# Patient Record
Sex: MALE | Race: WHITE | NOT HISPANIC OR LATINO | Employment: FULL TIME | URBAN - METROPOLITAN AREA
[De-identification: names, ages, dates, MRNs, and addresses within clinical notes are randomized per-mention and may not be internally consistent; named-entity substitution may affect disease eponyms.]

---

## 2017-01-02 ENCOUNTER — OFFICE VISIT (OUTPATIENT)
Dept: WOUND CARE | Facility: MEDICAL CENTER | Age: 62
End: 2017-01-02
Attending: ORTHOPAEDIC SURGERY
Payer: MEDICARE

## 2017-01-02 PROCEDURE — A6253 ABSORPT DRG > 48 SQ IN W/O B: HCPCS

## 2017-01-02 PROCEDURE — A6407 PACKING STRIPS, NON-IMPREG: HCPCS

## 2017-01-02 PROCEDURE — 305523

## 2017-01-02 PROCEDURE — 304216

## 2017-01-02 PROCEDURE — A6402 STERILE GAUZE <= 16 SQ IN: HCPCS

## 2017-01-02 PROCEDURE — 97597 DBRDMT OPN WND 1ST 20 CM/<: CPT

## 2017-01-02 NOTE — WOUND TEAM
Advanced Wound Care  [facility]  [address]  [address]  [place] [address]  [phone] Fax: [phone]      Encounter note  For Certification Period: 12/26/2016 - 01/26/2017      Referring Physician: Abhishek Edwards MD  Primary Physician: Knog Jha MD  Consulting Physicians:         Wound(s): L foot medial and dorsal foot incisions  Start of Care: 12/26/16       Subjective:        HPI: 61 year old gentleman with a hx of HIV+ and IDDM , Charcot deformity and a navicular fx.  Pt underwent ORIF and gastrocnemius recession as well as an ankle fusion.  S/p I/D dehisced area of his medial foot incision with NPWT wound VAC placed post-operatively on 7/16/16.  Pt DC from [facility] 7/20/16.    Patient wound had matristem placed for 9 sessions and closure of wound on 11/16/2016.  Patient recently felt feverish and leg pain on 12/10/16, went to [facility] and discharged 12/13. Was seen at wound clinic following discharge. S/p Left foot removal of hardware, I & D, ankle arthrotomy by Dr. Edwards on 12/22/16. Discharged from [facility] on 12/24 referred to [facility] for treatment of the incisions.      Pain:  5/10 on 0-10 pain scale to left foot, pt took own oral pain meds prior to visit    Past Medical History:  Past Medical History   Diagnosis Date   • HIV (human immunodeficiency virus infection) ([facility])    • Diabetes ([facility])    • Neuropathy ([facility])    • Arthritis    • Renal disorder      5/4/2016,pt states not aware of any renal disorder   • Sleep apnea      diagnosed with sleep apnea,has cpap,lost 60 lbs,uses cpap sometimes   • Back pain      5/24/2016 states back pain not an issue at this time   • Bronchitis    • Depression    • Chickenpox    • Mumps    • Tonsillitis    • Whooping cough        Current Medications:   coumadin restarted, taking augmentin for 14 days            Allergies:   Allergies   Allergen Reactions   • Oxycodone      Hallucination  Rxn = May 2016           Objective:      Tests and  Measures:  Left Foot warm  +1 DP pulse, confirmed with doppler, audible DP and PT pulse strongly    Orthotic, protective, supportive devices: offloading boot, Walker, knee scooter    Fall Risk Assessment (edward all that apply with an X): Completed 12/26/2016   65 years or older     xFall within the last 2 years,    xAmbulatory devices, FWW  Loss of protective sensation in feet,   xUse of prostethic/orthotic, years    Presence of lower extremity/foot/toe amputation   xTaking medication that increases risk (per facility policy)    Interventions Recommended (if any of the above are selected):   xUse of Assistive Device:_________fww______________   Supervision with ambulation:  Caregiver   xAssistance with ambulation:  Caregiver   Bashir safety education:  Educational material provided         Wound Characteristics                                                    Location:  Left Foot dorsal incision  Initial Evaluation  Date: 12/26/2016 Encounter date   1/2/17   Tissue Type and %: Incision with sutures with pinpoint opening Incision dehiscing, dark red tissue to wound bed that is visible     Periwound: intact Intact with sutures, erythema   Drainage: heavy serosanguinous with tissue flecks with epression Heavy old clotted blood and pale white debris expressed   Exposed structures None none   Wound Edges:   Approximated except for opening Opening between sutures    Odor: None none   S&S of Infection:   None erythema   Edema: +3 pitting +3   Sensation: intact intact               Measurements:  L dorsal incision Initial Evaluation  Date: 12/26/2016 Encounter note  Date: 12/30/2016   Length (cm) 6.5 5.5 (entire incision measured)   Width (cm) 0.3 0.5   Depth (cm) 0.6 1.0   Area (cm2) 1.95 2.75 cm2   Tract/undermine 0.6 none       Location:  Left Foot Medial Midfoot incision Initial Evaluation  Date: 12/26/2016 Encounter date 1/2/17   Tissue Type and %: Incision approximated with sutures except for pinpoint opening   "Incision dehiscing, dark red tissue to wound bed that is visible     Periwound: Slight maceration Intact with sutures, erythema   Drainage: Heavy serosanguinous with tissue flecks with expression heavy old clotted blood and pale white debris expressed   Exposed structures None none   Wound Edges:   Approximated except for opening Open center of incision   Odor: None none   S&S of Infection:   None Faint erythema   Edema: +3 pitting 3+   Sensation: intact intact               Measurements:  L medial foot incision  Initial Evaluation  Date: 2016 Encounter note  Date: 2016  Only area of dehicense measured   Length (cm) 9.5 1.2   Width (cm) 0.2 0.4   Depth (cm) 0.9 1.9   Area (cm2) 1.9 0.48 cm2   Tract/undermine 0.9 none        Procedures:      Debridement :  selective debridement with surgilav and non selectively with NS moisten gauze and cotton tip applicator   Cleansed with:   Saline ~250ml                                                                    Periwound protected with: barrier paste   Primary dressin/4\" plain strip packing to all tracts to both incisions. depth of 1.5cm but extends 8cm horizontally in proximal direction on dorsal incision. medial incision tract extends 5cm    Secondary Dressing: abd pads, kerlix   Other: tubigrip F     Patient Education: 17: VAC held for this treatment d/t increase in thick clotted debris. pt to come in tomorrow for dressing change and surgilav. Pt verbalized understanding, updated on change in plan of care. Pt has continual ankle pain, has appt with Dr. Vergara to discuss quality of life as amputee/process of amputation tomorrow afternoon    Professional Collaboration: Maryc PT, Dr. Edwards-notified and updated in pt condition       Assessment:      Wound etiology: surgical     Wound Progress:  Increased depth to wounds    Rationale for Treatment: plain strip to assist with removal of debris, abd pads to manage exudate    Patient " tolerance/compliance: Patient tolerated treatment well.    Complicating factors:  HIV+,  DM2    Need for ongoing Advanced Wound Care services:Patient requires skilled therapeutic wound care services for product selection, application of product, debridement, close monitoring with clinical assessment for expedite of wound healing.       Plan:      Treatment Plan and Recommendations:  Selective, non selective debridement       Frequency: 3x/weekly      Treatment Goals: STG 2 Weeks  LTG 4 Weeks   Granulation Tissue: n/a n/a   Decrease Necrotic Tissue to: n/a n/a   Wound Phase:  n/a  n/a   Decrease Size by: Decrease drainage by 50% Decrease drainage by 100%   Periwound:  intact intact   Decrease tracts/undermining by: 10% 20%   Decrease Pain:  4/10 0/10       At the time of each visit a thorough assessment of the patient is completed to assure the  appropriateness of our plan of care.  The dressings or modalities may need to be adapted   from the original plan to address any significant changes in the wound environment.          Clinician Signature:_______________________________Date__________________      Physician Signature:______________________________Date:__________________

## 2017-01-03 ENCOUNTER — OFFICE VISIT (OUTPATIENT)
Dept: WOUND CARE | Facility: MEDICAL CENTER | Age: 62
End: 2017-01-03
Attending: INTERNAL MEDICINE
Payer: MEDICARE

## 2017-01-03 PROCEDURE — A6253 ABSORPT DRG > 48 SQ IN W/O B: HCPCS

## 2017-01-03 PROCEDURE — 97597 DBRDMT OPN WND 1ST 20 CM/<: CPT

## 2017-01-03 PROCEDURE — 302985 HCHG BULKY ROLL STERILE

## 2017-01-04 ENCOUNTER — NON-PROVIDER VISIT (OUTPATIENT)
Dept: WOUND CARE | Facility: MEDICAL CENTER | Age: 62
End: 2017-01-04
Attending: ORTHOPAEDIC SURGERY
Payer: MEDICARE

## 2017-01-04 PROCEDURE — 97602 WOUND(S) CARE NON-SELECTIVE: CPT

## 2017-01-04 PROCEDURE — 302985 HCHG BULKY ROLL STERILE

## 2017-01-04 PROCEDURE — 97597 DBRDMT OPN WND 1ST 20 CM/<: CPT

## 2017-01-04 PROCEDURE — A6253 ABSORPT DRG > 48 SQ IN W/O B: HCPCS

## 2017-01-04 PROCEDURE — A6457 TUBULAR DRESSING: HCPCS

## 2017-01-04 NOTE — WOUND TEAM
Advanced Wound Care  [facility]  [address]  [address]  [place] [address]  [phone] Fax: [phone]      Encounter note  For Certification Period: 12/26/2016 - 01/26/2017      Referring Physician: Abhishek Edwards MD  Primary Physician: Kong Jha MD  Consulting Physicians:         Wound(s): L foot medial and dorsal foot incisions  Start of Care: 12/26/16       Subjective:        HPI: 61 year old gentleman with a hx of HIV+ and IDDM , Charcot deformity and a navicular fx.  Pt underwent ORIF and gastrocnemius recession as well as an ankle fusion.  S/p I/D dehisced area of his medial foot incision with NPWT wound VAC placed post-operatively on 7/16/16.  Pt DC from [facility] 7/20/16.    Patient wound had matristem placed for 9 sessions and closure of wound on 11/16/2016.  Patient recently felt feverish and leg pain on 12/10/16, went to [facility] and discharged 12/13. Was seen at wound clinic following discharge. S/p Left foot removal of hardware, I & D, ankle arthrotomy by Dr. Edwards on 12/22/16. Discharged from [facility] on 12/24 referred to [facility] for treatment of the incisions.      Pain:  5/10 on 0-10 pain scale to left foot, pt took own oral pain meds prior to visit    Past Medical History:  Past Medical History   Diagnosis Date   • HIV (human immunodeficiency virus infection) ([facility]    • Diabetes ([facility])    • Neuropathy ([facility])    • Arthritis    • Renal disorder      5/4/2016,pt states not aware of any renal disorder   • Sleep apnea      diagnosed with sleep apnea,has cpap,lost 60 lbs,uses cpap sometimes   • Back pain      5/24/2016 states back pain not an issue at this time   • Bronchitis    • Depression    • Chickenpox    • Mumps    • Tonsillitis    • Whooping cough        Current Medications:   coumadin restarted, taking augmentin for 14 days            Allergies:   Allergies   Allergen Reactions   • Oxycodone      Hallucination  Rxn = May 2016           Objective:      Tests and  Measures:  Left Foot warm  +1 DP pulse, confirmed with doppler, audible DP and PT pulse strongly    Orthotic, protective, supportive devices: offloading boot, Walker, knee scooter    Fall Risk Assessment (edward all that apply with an X): Completed 12/26/2016   65 years or older     xFall within the last 2 years,    xAmbulatory devices, FWW  Loss of protective sensation in feet,   xUse of prostethic/orthotic, years    Presence of lower extremity/foot/toe amputation   xTaking medication that increases risk (per facility policy)    Interventions Recommended (if any of the above are selected):   xUse of Assistive Device:_________fww______________   Supervision with ambulation:  Caregiver   xAssistance with ambulation:  Caregiver   Bashir safety education:  Educational material provided         Wound Characteristics                                                    Location:  Left Foot dorsal incision  Initial Evaluation  Date: 12/26/2016 Encounter date   1/3/17   Tissue Type and %: Incision with sutures with pinpoint opening Incision dehiscing, dark red tissue to wound bed that is visible     Periwound: intact Intact with sutures, erythema   Drainage: heavy serosanguinous with tissue flecks with epression Heavy old clotted blood and pale white chunky debris expressed   Exposed structures None none   Wound Edges:   Approximated except for opening Opening between sutures    Odor: None none   S&S of Infection:   None erythema   Edema: +3 pitting +3   Sensation: intact intact               Measurements:  L dorsal incision Initial Evaluation  Date: 12/26/2016 Encounter note  Date: 12/30/2016   Length (cm) 6.5 5.5 (entire incision measured)   Width (cm) 0.3 0.5   Depth (cm) 0.6 1.0   Area (cm2) 1.95 2.75 cm2   Tract/undermine 0.6 1/3/17: 5 cm       Location:  Left Foot Medial Midfoot incision Initial Evaluation  Date: 12/26/2016 Encounter date 1/3/17   Tissue Type and %: Incision approximated with sutures except for pinpoint  "opening  Incision dehiscing, dark red tissue to wound bed that is visible     Periwound: Slight maceration Intact with sutures, erythema   Drainage: Heavy serosanguinous with tissue flecks with expression heavy old clotted blood and pale white debris expressed   Exposed structures None none   Wound Edges:   Approximated except for opening Open center of incision   Odor: None none   S&S of Infection:   None Faint erythema   Edema: +3 pitting 3+   Sensation: intact intact               Measurements:  L medial foot incision  Initial Evaluation  Date: 2016 Encounter note  Date: 2016  Only area of dehicense measured   Length (cm) 9.5 1.2   Width (cm) 0.2 0.4   Depth (cm) 0.9 1.9   Area (cm2) 1.9 0.48 cm2   Tract/undermine 0.9 1/3/17 8cm     1/3/17:  Measurements not done due to time constraints with scheduling    Procedures:      Debridement :  selective debridement with surgilav and non selectively with NS moisten gauze and cotton tip applicator- \" milking\" of tissue to express thick drainage   Cleansed with:   Saline ~250ml                                                                    Periwound protected with: barrier paste   Primary dressin/4\" plain strip packing to all tracts to both incisions.   Secondary Dressing: abd pads, kerlix   Other: tubigrip F     Patient Education: 1/3/17:  Today continue POC due to similar findings. Discussed amputation issues briefly. Pt seeing Dr. Vergara tomorrow.   17: VAC held for this treatment d/t increase in thick clotted debris. pt to come in tomorrow for dressing change and surgilav. Pt verbalized understanding, updated on change in plan of care. Pt has continual ankle pain, has appt with Dr. Vergara to discuss quality of life as amputee/process of amputation tomorrow afternoon    Professional Collaboration:     Assessment:      Wound etiology: surgical     Wound Progress:  Increased depth to wounds    Rationale for Treatment: plain strip to assist with " removal of debris, abd pads to manage exudate    Patient tolerance/compliance: Patient tolerated treatment well.    Complicating factors:  HIV+,  DM2    Need for ongoing Advanced Wound Care services:Patient requires skilled therapeutic wound care services for product selection, application of product, debridement, close monitoring with clinical assessment for expedite of wound healing.       Plan:      Treatment Plan and Recommendations:  Selective, non selective debridement       Frequency: 3x/weekly      Treatment Goals: STG 2 Weeks  LTG 4 Weeks   Granulation Tissue: n/a n/a   Decrease Necrotic Tissue to: n/a n/a   Wound Phase:  n/a  n/a   Decrease Size by: Decrease drainage by 50% Decrease drainage by 100%   Periwound:  intact intact   Decrease tracts/undermining by: 10% 20%   Decrease Pain:  4/10 0/10       At the time of each visit a thorough assessment of the patient is completed to assure the  appropriateness of our plan of care.  The dressings or modalities may need to be adapted   from the original plan to address any significant changes in the wound environment.          Clinician Signature:_______________________________Date__________________      Physician Signature:______________________________Date:__________________

## 2017-01-04 NOTE — WOUND TEAM
Advanced Wound Care  [facility]  [address]  [address]  [place] [address]  [phone] Fax: [phone]      Encounter note  For Certification Period: 12/26/2016 - 01/26/2017      Referring Physician: Abhishek Edwards MD  Primary Physician: Kong Jha MD  Consulting Physicians:         Wound(s): L foot medial and dorsal foot incisions  Start of Care: 12/26/16       Subjective:        HPI: 61 year old gentleman with a hx of HIV+ and IDDM , Charcot deformity and a navicular fx.  Pt underwent ORIF and gastrocnemius recession as well as an ankle fusion.  S/p I/D dehisced area of his medial foot incision with NPWT wound VAC placed post-operatively on 7/16/16.  Pt DC from [facility] 7/20/16.    Patient wound had matristem placed for 9 sessions and closure of wound on 11/16/2016.  Patient recently felt feverish and leg pain on 12/10/16, went to [facility] and discharged 12/13. Was seen at wound clinic following discharge. S/p Left foot removal of hardware, I & D, ankle arthrotomy by Dr. Edwards on 12/22/16. Discharged from [facility] on 12/24 referred to [facility] for treatment of the incisions.      Pain:  5/10 on 0-10 pain scale to left foot, pt took own oral pain meds prior to visit    Past Medical History:  Past Medical History   Diagnosis Date   • HIV (human immunodeficiency virus infection) ([facility])    • Diabetes ([facility])    • Neuropathy ([facility])    • Arthritis    • Renal disorder      5/4/2016,pt states not aware of any renal disorder   • Sleep apnea      diagnosed with sleep apnea,has cpap,lost 60 lbs,uses cpap sometimes   • Back pain      5/24/2016 states back pain not an issue at this time   • Bronchitis    • Depression    • Chickenpox    • Mumps    • Tonsillitis    • Whooping cough        Current Medications:   coumadin restarted, taking augmentin for 14 days            Allergies:   Allergies   Allergen Reactions   • Oxycodone      Hallucination  Rxn = May 2016           Objective:      Tests and  Measures:  Left Foot warm  +1 DP pulse, confirmed with doppler, audible DP and PT pulse strongly    Orthotic, protective, supportive devices: offloading boot, Walker, knee scooter    Fall Risk Assessment (edward all that apply with an X): Completed 12/26/2016   65 years or older     xFall within the last 2 years,    xAmbulatory devices, FWW  Loss of protective sensation in feet,   xUse of prostethic/orthotic, years    Presence of lower extremity/foot/toe amputation   xTaking medication that increases risk (per facility policy)    Interventions Recommended (if any of the above are selected):   xUse of Assistive Device:_________fww______________   Supervision with ambulation:  Caregiver   xAssistance with ambulation:  Caregiver   Bashir safety education:  Educational material provided         Wound Characteristics                                                    Location:  Left Foot dorsal incision  Initial Evaluation  Date: 12/26/2016 Encounter date   1/4/17   Tissue Type and %: Incision with sutures with pinpoint opening Incision dehiscing, dark red tissue to wound bed that is visible     Periwound: intact Intact with sutures, erythema   Drainage: heavy serosanguinous with tissue flecks with epression Heavy old clotted blood and pale white chunky debris expressed   Exposed structures None none   Wound Edges:   Approximated except for opening Opening between sutures    Odor: None none   S&S of Infection:   None erythema   Edema: +3 pitting +3   Sensation: intact intact               Measurements:  L dorsal incision Initial Evaluation  Date: 12/26/2016 Encounter note  Date: 1/4/17   Length (cm) 6.5 1.0   Width (cm) 0.3 0.5   Depth (cm) 0.6 1.0   Area (cm2) 1.95 2.75 cm2   Tract/undermine 0.6 4 cm from 11:00       Location:  Left Foot Medial Midfoot incision Initial Evaluation  Date: 12/26/2016 Encounter date 1/4/17   Tissue Type and %: Incision approximated with sutures except for pinpoint opening  Incision dehiscing,  "dark red tissue to wound bed that is visible     Periwound: Slight maceration Intact with sutures, erythema   Drainage: Heavy serosanguinous with tissue flecks with expression heavy old clotted blood and pale white debris expressed   Exposed structures None none   Wound Edges:   Approximated except for opening Open center of incision   Odor: None none   S&S of Infection:   None Faint erythema   Edema: +3 pitting 3+   Sensation: intact intact               Measurements:  L medial foot incision  Initial Evaluation  Date: 2016 Encounter note  Date: 17   Length (cm) 9.5 3 areas of dihisence along suture line   Width (cm) 0.2    Depth (cm) 0.9    Area (cm2) 1.9    Tract/undermine 0.9  4-6 cm from 6:00 to 3:00     1/3/17:  Measurements not done due to time constraints with scheduling    Procedures:      Debridement :  selective debridement with surgilav and non selectively with NS moisten gauze and cotton tip applicator- \" milking\" of tissue to express thick drainage   Cleansed with:   Saline ~250ml                                                                    Periwound protected with: barrier paste   Primary dressin/4\" plain strip packing to all tracts to both incisions. On long piece medially and 2 laterally   Secondary Dressing: abd pads, kerlix   Other: tubigrip F     Patient Education: 17:  Discussed S&S of infection.  Pt to watch area or erythema on ankle- if it rises pt needs to seek medical attn. ASAP for possible IV abx.  Pt to come to Dr. Tijerina's round 17 to have them assess foot.  Pt to have consult with Dr. Vergara on 17.  Dr. Edwards returns 17.    1/3/17:  Today continue POC due to similar findings. Discussed amputation issues briefly. Pt seeing Dr. Vergara tomorrow.   17: VAC held for this treatment d/t increase in thick clotted debris. pt to come in tomorrow for dressing change and surgilav. Pt verbalized understanding, updated on change in plan of care. Pt has " continual ankle pain, has appt with Dr. Vergara to discuss quality of life as amputee/process of amputation tomorrow afternoon    Professional Collaboration:   1/4/17:  Discussed situation with Mariam PRAKASH at length this am.  Pt on rounds for 1-6-17. Amputation has been and will be discussed.   Assessment:      Wound etiology: surgical     Wound Progress:  Increased depth to wounds- continued purulence and necrotic tissue copiously emerging from wound    Rationale for Treatment: plain strip to assist with removal of debris, abd pads to manage exudate    Patient tolerance/compliance: Patient tolerated treatment well.    Complicating factors:  HIV+,  DM2    Need for ongoing Advanced Wound Care services:Patient requires skilled therapeutic wound care services for product selection, application of product, debridement, close monitoring with clinical assessment for expedite of wound healing.       Plan:      Treatment Plan and Recommendations:  Selective, non selective debridement       Frequency: 3x/weekly      Treatment Goals: STG 2 Weeks  LTG 4 Weeks   Granulation Tissue: n/a n/a   Decrease Necrotic Tissue to: n/a n/a   Wound Phase:  n/a  n/a   Decrease Size by: Decrease drainage by 50% Decrease drainage by 100%   Periwound:  intact intact   Decrease tracts/undermining by: 10% 20%   Decrease Pain:  4/10 0/10       At the time of each visit a thorough assessment of the patient is completed to assure the  appropriateness of our plan of care.  The dressings or modalities may need to be adapted   from the original plan to address any significant changes in the wound environment.          Clinician Signature:_______________________________Date__________________      Physician Signature:______________________________Date:__________________

## 2017-01-05 ENCOUNTER — HOSPITAL ENCOUNTER (OUTPATIENT)
Facility: MEDICAL CENTER | Age: 62
End: 2017-01-06
Attending: EMERGENCY MEDICINE | Admitting: EMERGENCY MEDICINE
Payer: MEDICARE

## 2017-01-05 ENCOUNTER — APPOINTMENT (OUTPATIENT)
Dept: RADIOLOGY | Facility: MEDICAL CENTER | Age: 62
End: 2017-01-05
Attending: EMERGENCY MEDICINE
Payer: MEDICARE

## 2017-01-05 ENCOUNTER — OFFICE VISIT (OUTPATIENT)
Dept: URGENT CARE | Facility: PHYSICIAN GROUP | Age: 62
End: 2017-01-05
Payer: MEDICARE

## 2017-01-05 ENCOUNTER — OFFICE VISIT (OUTPATIENT)
Dept: WOUND CARE | Facility: MEDICAL CENTER | Age: 62
End: 2017-01-05
Attending: ORTHOPAEDIC SURGERY
Payer: MEDICARE

## 2017-01-05 ENCOUNTER — RESOLUTE PROFESSIONAL BILLING HOSPITAL PROF FEE (OUTPATIENT)
Dept: HOSPITALIST | Facility: MEDICAL CENTER | Age: 62
End: 2017-01-05
Payer: MEDICARE

## 2017-01-05 VITALS
SYSTOLIC BLOOD PRESSURE: 120 MMHG | DIASTOLIC BLOOD PRESSURE: 62 MMHG | HEART RATE: 94 BPM | RESPIRATION RATE: 16 BRPM | WEIGHT: 180 LBS | OXYGEN SATURATION: 95 % | TEMPERATURE: 98 F | BODY MASS INDEX: 25.83 KG/M2

## 2017-01-05 DIAGNOSIS — R53.1 WEAKNESS: ICD-10-CM

## 2017-01-05 DIAGNOSIS — B00.9 HERPES SIMPLEX: ICD-10-CM

## 2017-01-05 DIAGNOSIS — R21 RASH: ICD-10-CM

## 2017-01-05 DIAGNOSIS — R41.0 CONFUSION: ICD-10-CM

## 2017-01-05 PROBLEM — B20 AIDS (ACQUIRED IMMUNE DEFICIENCY SYNDROME) (HCC): Status: ACTIVE | Noted: 2017-01-05

## 2017-01-05 LAB
ALBUMIN SERPL BCP-MCNC: 2.8 G/DL (ref 3.2–4.9)
ALBUMIN/GLOB SERPL: 0.7 G/DL
ALP SERPL-CCNC: 178 U/L (ref 30–99)
ALT SERPL-CCNC: 7 U/L (ref 2–50)
ANION GAP SERPL CALC-SCNC: 8 MMOL/L (ref 0–11.9)
ANISOCYTOSIS BLD QL SMEAR: ABNORMAL
APPEARANCE UR: CLEAR
AST SERPL-CCNC: 16 U/L (ref 12–45)
BACTERIA #/AREA URNS HPF: ABNORMAL /HPF
BASOPHILS # BLD AUTO: 0 % (ref 0–1.8)
BASOPHILS # BLD: 0 K/UL (ref 0–0.12)
BILIRUB SERPL-MCNC: 0.3 MG/DL (ref 0.1–1.5)
BILIRUB UR QL STRIP.AUTO: NEGATIVE
BNP SERPL-MCNC: 65 PG/ML (ref 0–100)
BUN SERPL-MCNC: 7 MG/DL (ref 8–22)
CALCIUM SERPL-MCNC: 8.7 MG/DL (ref 8.5–10.5)
CHLORIDE SERPL-SCNC: 96 MMOL/L (ref 96–112)
CO2 SERPL-SCNC: 28 MMOL/L (ref 20–33)
COLOR UR: YELLOW
CREAT SERPL-MCNC: 0.41 MG/DL (ref 0.5–1.4)
CULTURE IF INDICATED INDCX: YES UA CULTURE
EOSINOPHIL # BLD AUTO: 0.28 K/UL (ref 0–0.51)
EOSINOPHIL NFR BLD: 3.5 % (ref 0–6.9)
ERYTHROCYTE [DISTWIDTH] IN BLOOD BY AUTOMATED COUNT: 50.7 FL (ref 35.9–50)
GFR SERPL CREATININE-BSD FRML MDRD: >60 ML/MIN/1.73 M 2
GLOBULIN SER CALC-MCNC: 3.8 G/DL (ref 1.9–3.5)
GLUCOSE SERPL-MCNC: 164 MG/DL (ref 65–99)
GLUCOSE UR STRIP.AUTO-MCNC: NEGATIVE MG/DL
HCT VFR BLD AUTO: 26.3 % (ref 42–52)
HGB BLD-MCNC: 8.1 G/DL (ref 14–18)
KETONES UR STRIP.AUTO-MCNC: NEGATIVE MG/DL
LACTATE BLD-SCNC: 1.1 MMOL/L (ref 0.5–2)
LEUKOCYTE ESTERASE UR QL STRIP.AUTO: NEGATIVE
LG PLATELETS BLD QL SMEAR: NORMAL
LYMPHOCYTES # BLD AUTO: 0.28 K/UL (ref 1–4.8)
LYMPHOCYTES NFR BLD: 3.5 % (ref 22–41)
MANUAL DIFF BLD: NORMAL
MCH RBC QN AUTO: 24.2 PG (ref 27–33)
MCHC RBC AUTO-ENTMCNC: 30.8 G/DL (ref 33.7–35.3)
MCV RBC AUTO: 78.5 FL (ref 81.4–97.8)
METAMYELOCYTES NFR BLD MANUAL: 1.7 %
MICRO URNS: ABNORMAL
MICROCYTES BLD QL SMEAR: ABNORMAL
MONOCYTES # BLD AUTO: 0.42 K/UL (ref 0–0.85)
MONOCYTES NFR BLD AUTO: 5.2 % (ref 0–13.4)
MORPHOLOGY BLD-IMP: NORMAL
NEUTROPHILS # BLD AUTO: 6.89 K/UL (ref 1.82–7.42)
NEUTROPHILS NFR BLD: 86.1 % (ref 44–72)
NITRITE UR QL STRIP.AUTO: NEGATIVE
NRBC # BLD AUTO: 0 K/UL
NRBC BLD AUTO-RTO: 0 /100 WBC
PH UR STRIP.AUTO: 7 [PH]
PLATELET # BLD AUTO: 584 K/UL (ref 164–446)
PLATELET BLD QL SMEAR: NORMAL
PMV BLD AUTO: 9.2 FL (ref 9–12.9)
POIKILOCYTOSIS BLD QL SMEAR: NORMAL
POTASSIUM SERPL-SCNC: 4.2 MMOL/L (ref 3.6–5.5)
PROT SERPL-MCNC: 6.6 G/DL (ref 6–8.2)
PROT UR QL STRIP: 50 MG/DL
RBC # BLD AUTO: 3.35 M/UL (ref 4.7–6.1)
RBC # URNS HPF: ABNORMAL /HPF
RBC BLD AUTO: PRESENT
RBC UR QL AUTO: NEGATIVE
SODIUM SERPL-SCNC: 132 MMOL/L (ref 135–145)
SP GR UR STRIP.AUTO: 1.01
STOMATOCYTES BLD QL SMEAR: NORMAL
TROPONIN I SERPL-MCNC: <0.01 NG/ML (ref 0–0.04)
WBC # BLD AUTO: 8 K/UL (ref 4.8–10.8)
WBC #/AREA URNS HPF: ABNORMAL /HPF

## 2017-01-05 PROCEDURE — 87086 URINE CULTURE/COLONY COUNT: CPT

## 2017-01-05 PROCEDURE — 99220 PR INITIAL OBSERVATION CARE,LEVL III: CPT | Performed by: HOSPITALIST

## 2017-01-05 PROCEDURE — 305382 HCHG ROLL-SOFT CONFORMING 4"

## 2017-01-05 PROCEDURE — 99213 OFFICE O/P EST LOW 20 MIN: CPT | Performed by: NURSE PRACTITIONER

## 2017-01-05 PROCEDURE — G0378 HOSPITAL OBSERVATION PER HR: HCPCS

## 2017-01-05 PROCEDURE — 85027 COMPLETE CBC AUTOMATED: CPT

## 2017-01-05 PROCEDURE — 83880 ASSAY OF NATRIURETIC PEPTIDE: CPT

## 2017-01-05 PROCEDURE — 71010 DX-CHEST-PORTABLE (1 VIEW): CPT

## 2017-01-05 PROCEDURE — 70450 CT HEAD/BRAIN W/O DYE: CPT

## 2017-01-05 PROCEDURE — 93005 ELECTROCARDIOGRAM TRACING: CPT | Performed by: EMERGENCY MEDICINE

## 2017-01-05 PROCEDURE — 85007 BL SMEAR W/DIFF WBC COUNT: CPT

## 2017-01-05 PROCEDURE — 80053 COMPREHEN METABOLIC PANEL: CPT

## 2017-01-05 PROCEDURE — 84484 ASSAY OF TROPONIN QUANT: CPT

## 2017-01-05 PROCEDURE — 36415 COLL VENOUS BLD VENIPUNCTURE: CPT

## 2017-01-05 PROCEDURE — A6253 ABSORPT DRG > 48 SQ IN W/O B: HCPCS

## 2017-01-05 PROCEDURE — 306250

## 2017-01-05 PROCEDURE — 81001 URINALYSIS AUTO W/SCOPE: CPT

## 2017-01-05 PROCEDURE — 99285 EMERGENCY DEPT VISIT HI MDM: CPT

## 2017-01-05 PROCEDURE — 83605 ASSAY OF LACTIC ACID: CPT

## 2017-01-05 PROCEDURE — 97597 DBRDMT OPN WND 1ST 20 CM/<: CPT

## 2017-01-05 PROCEDURE — A6402 STERILE GAUZE <= 16 SQ IN: HCPCS

## 2017-01-05 RX ORDER — AMOXICILLIN AND CLAVULANATE POTASSIUM 875; 125 MG/1; MG/1
1 TABLET, FILM COATED ORAL 2 TIMES DAILY
Status: DISCONTINUED | OUTPATIENT
Start: 2017-01-05 | End: 2017-01-05

## 2017-01-05 RX ORDER — PROMETHAZINE HYDROCHLORIDE 25 MG/1
12.5-25 SUPPOSITORY RECTAL EVERY 4 HOURS PRN
Status: DISCONTINUED | OUTPATIENT
Start: 2017-01-05 | End: 2017-01-06 | Stop reason: HOSPADM

## 2017-01-05 RX ORDER — EMTRICITABINE AND TENOFOVIR DISOPROXIL FUMARATE 200; 300 MG/1; MG/1
1 TABLET, FILM COATED ORAL DAILY
Status: DISCONTINUED | OUTPATIENT
Start: 2017-01-06 | End: 2017-01-06 | Stop reason: HOSPADM

## 2017-01-05 RX ORDER — DEXTROSE MONOHYDRATE 25 G/50ML
25 INJECTION, SOLUTION INTRAVENOUS
Status: DISCONTINUED | OUTPATIENT
Start: 2017-01-05 | End: 2017-01-06 | Stop reason: HOSPADM

## 2017-01-05 RX ORDER — ONDANSETRON 2 MG/ML
4 INJECTION INTRAMUSCULAR; INTRAVENOUS EVERY 4 HOURS PRN
Status: DISCONTINUED | OUTPATIENT
Start: 2017-01-05 | End: 2017-01-06 | Stop reason: HOSPADM

## 2017-01-05 RX ORDER — ACETAMINOPHEN 325 MG/1
650 TABLET ORAL EVERY 6 HOURS PRN
Status: DISCONTINUED | OUTPATIENT
Start: 2017-01-05 | End: 2017-01-06 | Stop reason: HOSPADM

## 2017-01-05 RX ORDER — ACYCLOVIR 800 MG/1
800 TABLET ORAL 2 TIMES DAILY
Status: DISCONTINUED | OUTPATIENT
Start: 2017-01-05 | End: 2017-01-06

## 2017-01-05 RX ORDER — ENALAPRILAT 1.25 MG/ML
1.25 INJECTION INTRAVENOUS EVERY 6 HOURS PRN
Status: DISCONTINUED | OUTPATIENT
Start: 2017-01-05 | End: 2017-01-06 | Stop reason: HOSPADM

## 2017-01-05 RX ORDER — ACYCLOVIR 800 MG/1
800 TABLET ORAL 2 TIMES DAILY
Qty: 10 TAB | Refills: 0 | Status: SHIPPED | OUTPATIENT
Start: 2017-01-05 | End: 2020-01-15

## 2017-01-05 RX ORDER — ONDANSETRON 4 MG/1
4 TABLET, ORALLY DISINTEGRATING ORAL EVERY 4 HOURS PRN
Status: DISCONTINUED | OUTPATIENT
Start: 2017-01-05 | End: 2017-01-06 | Stop reason: HOSPADM

## 2017-01-05 RX ORDER — HYDROMORPHONE HYDROCHLORIDE 2 MG/1
2 TABLET ORAL
Status: DISCONTINUED | OUTPATIENT
Start: 2017-01-05 | End: 2017-01-06 | Stop reason: HOSPADM

## 2017-01-05 RX ORDER — FERROUS SULFATE 325(65) MG
325 TABLET ORAL
Status: DISCONTINUED | OUTPATIENT
Start: 2017-01-06 | End: 2017-01-06 | Stop reason: HOSPADM

## 2017-01-05 RX ORDER — PROMETHAZINE HYDROCHLORIDE 25 MG/1
12.5-25 TABLET ORAL EVERY 4 HOURS PRN
Status: DISCONTINUED | OUTPATIENT
Start: 2017-01-05 | End: 2017-01-06 | Stop reason: HOSPADM

## 2017-01-05 RX ORDER — PAROXETINE HYDROCHLORIDE 20 MG/1
20 TABLET, FILM COATED ORAL DAILY
Status: DISCONTINUED | OUTPATIENT
Start: 2017-01-06 | End: 2017-01-06 | Stop reason: HOSPADM

## 2017-01-05 RX ORDER — SULFAMETHOXAZOLE AND TRIMETHOPRIM 800; 160 MG/1; MG/1
1 TABLET ORAL DAILY
Status: DISCONTINUED | OUTPATIENT
Start: 2017-01-06 | End: 2017-01-06 | Stop reason: HOSPADM

## 2017-01-05 RX ORDER — PNV NO.95/FERROUS FUM/FOLIC AC 28MG-0.8MG
1 TABLET ORAL
Status: DISCONTINUED | OUTPATIENT
Start: 2017-01-05 | End: 2017-01-05

## 2017-01-05 ASSESSMENT — ENCOUNTER SYMPTOMS
SHORTNESS OF BREATH: 0
LOSS OF CONSCIOUSNESS: 0
HEADACHES: 0
BLURRED VISION: 0
MYALGIAS: 0
COUGH: 0
HEADACHES: 0
VOMITING: 0
FEVER: 0
CHILLS: 0
ABDOMINAL PAIN: 0
NAUSEA: 0
WEAKNESS: 1

## 2017-01-05 ASSESSMENT — LIFESTYLE VARIABLES
EVER_SMOKED: NEVER
ALCOHOL_USE: NO

## 2017-01-05 ASSESSMENT — PAIN SCALES - GENERAL
PAINLEVEL_OUTOF10: 7
PAINLEVEL_OUTOF10: 3
PAINLEVEL_OUTOF10: 3

## 2017-01-05 NOTE — MR AVS SNAPSHOT
Edward Ohara   2017 3:00 PM   Office Visit   MRN: [id number]    Department:  [facility]   Dept Phone:  [phone]    Description:  Male : 1955   Provider:  JUAN ANTONIO Belle           Reason for Visit     Rash rash on bottom x 1 wk       Allergies as of 2017     Allergen Noted Reactions    Oxycodone 2016       Hallucination  Rxn = May 2016      You were diagnosed with     Herpes simplex   [[id number]]         Vital Signs     Blood Pressure Pulse Temperature Respirations Weight Oxygen Saturation    120/62 mmHg 94 36.7 °C (98 °F) 16 81.647 kg (180 lb) 95%    Smoking Status                   Never Smoker            Basic Information     Date Of Birth Sex Race Ethnicity Preferred Language    1955 Male White Non- English      Your appointments     2017  7:30 AM   Wound 30 Minute Procedure with Sonido Tijerina M.D., Briana Stallworth R.N.   [facility] ([address] [place] [address])    [address]  [place] [address]   [phone]            2017  9:00 AM   Wound 60 Minute Procedure with Nancy Mancilla R.N.   [facility] ([address] [place] [address])    [address]  [place] [address]   [phone]            2017  8:00 AM   Wound 60 Minute Procedure with Suki Hurt P.T.   [facility] ([address] [place] [address])    [address]  [place] [address]   [phone]            2017  1:00 PM   Follow UP with Abdullahi Xavier M.D.   [facility] (--)    [address]  [place] [address] [phone]            2017  8:15 AM   Wound 30 Minute Procedure with Sonido Tijerina M.D., Briana Stallworth R.N.   [facility] ([address] [place] [address])    [address]  [place] [address]   [phone]            2017  7:00 AM   Wound 30 Minute Procedure with Marianna Leyva R.N.   [facility] ([address] [place] [address])    [address]  [place] [address]   [phone]               Jan 16, 2017  7:30 AM   FOOT AND NAIL CARE INITIAL EVAL with Marianna Leyva R.N.   [facility] ([address] [place] [address])    [address]  [place] [address]   [phone]            Jan 18, 2017  8:00 AM   Wound 60 Minute Procedure with Nancy Mancilla R.N.   [facility] ([address] [place] [address])    [address]  [place] [address]   [phone]            Jan 20, 2017  9:00 AM   Wound 30 Minute Procedure with Suki Hurt P.T.   [facility] ([address] [place] [address])    [address]  [place] [address]   [phone]            Jan 23, 2017  8:00 AM   Wound 60 Minute Procedure with Petey Abel R.N.   [facility] ([address] [place] [address])    [address]  [place] [address]   [phone]            Jan 25, 2017  8:30 AM   Wound 60 Minute Procedure with Ebony Sanabria P.T.   [facility] ([address] [place] [address])    [address]  [place] [address]   [phone]            Jan 27, 2017 10:00 AM   Wound 60 Minute Procedure with Grace Watson R.N.   [facility] ([address] [place] [address])    [address]  [place] [address]   [phone]            Apr 26, 2017  1:00 PM   Diabetes Care Visit with Kacie Weston M.D., ENDOCRINOLOGY DIABETES RN   [facility])    [address]  [facility] [id number] [phone]           You will be receiving a confirmation call a few days before your appointment from our automated call confirmation system.              Problem List              ICD-10-CM Priority Class Noted - Resolved    Type 2 diabetes mellitus (HCC) E11.9 Medium  5/6/2015 - Present    Cellulitis of left foot L03.116   6/24/2015 - Present    HIV (human immunodeficiency virus infection) (HCC) Z21   8/27/2015 - Present    DKA (diabetic ketoacidoses) (HCC) E13.10   11/12/2015 - Present    Low back pain M54.5   11/13/2015 - Present    Rash R21   11/13/2015 - Present    Depression F32.9   11/13/2015 - Present    Closed  displaced fracture of navicular bone of left foot S92.252A   5/26/2016 - Present    Atrial fibrillation with rapid ventricular response (HCC) I48.91   6/4/2016 - Present    Peripheral neuropathy ([facility]) G62.9 Medium  6/5/2016 - Present    SARAY (obstructive sleep apnea) G47.33   6/15/2016 - Present    HIV infection ([facility]) Z21   6/15/2016 - Present    Type 2 diabetes mellitus without complication ([facility]) E11.9   6/15/2016 - Present    Atrial fibrillation status post cardioversion ([facility]) I48.91   6/15/2016 - Present    Excessive daytime sleepiness G47.19   6/15/2016 - Present    Charcot foot due to diabetes mellitus ([facility]) E11.610   7/12/2016 - Present    Infected wound T14.8, L08.9   7/12/2016 - Present    AIDS ([facility]) B20 Medium  7/13/2016 - Present    Anemia D64.9 Medium  7/13/2016 - Present    Hypokalemia E87.6 Medium  7/13/2016 - Present    Chronic osteomyelitis of left foot ([facility]) M86.672   10/17/2016 - Present    Microcytic anemia D50.9   12/11/2016 - Present    Osteomyelitis of foot, left, acute ([facility]) M86.172 High  12/11/2016 - Present    Closed fracture of navicular bone of left foot S92.252A   12/22/2016 - Present    Charcot's joint of ankle M14.679   12/22/2016 - Present      Health Maintenance        Date Due Completion Dates    IMM HEP B VACCINE (1 of 3 - Primary Series) 1955 ---    DIABETES MONOFILAMTENT / LE EXAM 4[birth year]15/1956 ---    IMM DTaP/Tdap/Td Vaccine (1 - Tdap) 10/15/1975 ---    COLONOSCOPY 10/15/2006 ---    RETINAL SCREENING 4/2/2016 4/2/2015 (Done)    Override on 4/2/2015: Done    IMM PNEUMOCOCCAL 19-64 (ADULT) HIGHEST RISK SERIES (2 of 3 - PCV13) 6/25/2016 6/25/2015    IMM INFLUENZA (1) 9/1/2016 11/5/2015, 9/9/2015, 9/9/2014    IMM ZOSTER VACCINE 10/15/2016 ---    A1C SCREENING 1/12/2017 7/12/2016, 6/5/2016, 2/16/2016, 12/3/2015, 11/13/2015, 9/15/2015, 6/25/2015, 5/6/2015, 2/27/2015    FASTING LIPID PROFILE 7/14/2017 7/14/2016, 6/5/2016, 2/8/2016, 12/3/2015, 2/27/2015    URINE ACR / MICROALBUMIN  10/25/2017 10/25/2016, 12/3/2015, 2/27/2015    SERUM CREATININE 12/23/2017 12/23/2016, 12/22/2016, 12/11/2016, 12/10/2016, 10/19/2016, 9/26/2016, 9/19/2016, 9/12/2016, 9/7/2016, 8/31/2016, 8/24/2016, 8/20/2016, 8/19/2016, 7/17/2016, 7/16/2016, 7/14/2016, 7/13/2016, 7/12/2016, 7/9/2016, 6/5/2016, 6/4/2016, 5/24/2016, 2/8/2016, 12/3/2015, 11/14/2015, 11/13/2015, 11/13/2015, 11/13/2015, 11/12/2015, 9/17/2015, 9/16/2015, 9/15/2015, 6/25/2015, 6/24/2015, 2/27/2015            Current Immunizations     Influenza TIV (IM) 9/9/2015, 9/9/2014    Influenza Vaccine Adult HD 11/5/2015    Pneumococcal polysaccharide vaccine (PPSV-23) 6/25/2015  5:12 PM      Below and/or attached are the medications your provider expects you to take. Review all of your home medications and newly ordered medications with your provider and/or pharmacist. Follow medication instructions as directed by your provider and/or pharmacist. Please keep your medication list with you and share with your provider. Update the information when medications are discontinued, doses are changed, or new medications (including over-the-counter products) are added; and carry medication information at all times in the event of emergency situations     Allergies:  OXYCODONE - (reactions not documented)               Medications  Valid as of: January 05, 2017 -  3:20 PM    Generic Name Brand Name Tablet Size Instructions for use    Acetaminophen (Tab) TYLENOL 500 MG Take 1,000 mg by mouth every 6 hours as needed.        Acyclovir (Tab) ZOVIRAX 800 MG Take 1 Tab by mouth 2 times a day.        Amoxicillin-Pot Clavulanate (Tab) AUGMENTIN 875-125 MG Take 1 Tab by mouth 2 times a day for 14 days.        Calcium Carbonate-Vitamin D (Tab) Calcium Carbonate-Vitamin D 600-400 MG-UNIT Take 2 Tabs by mouth every day.        Emtricitabine-Tenofovir DF (Tab) TRUVADA 200-300 MG Take 1 Tab by mouth every day.        Ferrous Sulfate (Tab) Iron 325 (65 FE) MG Take 1 Tab by mouth 3 times a  "day, with meals.        HYDROmorphone HCl (Tab) DILAUDID 2 MG Take 1 Tab by mouth every 3 hours as needed.        Insulin NPH Human (Isophane) (Suspension) HUMULIN,NOVOLIN 100 UNIT/ML Inject 40 Units as instructed 2 Times a Day. Pt takes:   30 units at 1200  30 units HS        Insulin Regular Human (Solution) HUMULIN R 100 Unit/mL Inject 5-30 Units as instructed 3 times a day before meals. Pt states \"I use a sliding scale\".        MetFORMIN HCl (Tab) GLUCOPHAGE 1000 MG Take 1,000 mg by mouth 2 times a day, with meals.        Morphine Sulfate (Tab) MS IR 15 MG Take 1 Tab by mouth every four hours as needed for Severe Pain.        Multiple Vitamin (Tab) THERAGRAN  Take 2 Tabs by mouth every day. Indications: pt has been taking \"for years\"        PARoxetine HCl (Tab) PAXIL 20 MG Take 1 Tab by mouth every day.        Raltegravir Potassium (Tab) ISENTRESS 400 MG Take 1 Tab by mouth 2 Times a Day.        Sulfamethoxazole-Trimethoprim (Tab) BACTRIM -160 MG Take 1 Tab by mouth every day.        Warfarin Sodium (Tab) COUMADIN 5 MG Take 5-7.5 mg by mouth every day. Pt takes:  5MG on Sun, Tue, Thur, and Sat  7.5MG on Mon, Wed, and Fri        .                 Medicines prescribed today were sent to:     [id number] - [place], [place] - [address] [address]    Phone: [phone] Fax: [phone]    Open 24 Hours?: No      Medication refill instructions:       If your prescription bottle indicates you have medication refills left, it is not necessary to call your provider’s office. Please contact your pharmacy and they will refill your medication.    If your prescription bottle indicates you do not have any refills left, you may request refills at any time through one of the following ways: The online [employer]) system (except Urgent Care), by calling your provider’s office, or by asking your pharmacy to contact your provider’s office with a refill request. Medication refills are processed " only during regular business hours and may not be available until the next business day. Your provider may request additional information or to have a follow-up visit with you prior to refilling your medication.   *Please Note: Medication refills are assigned a new Rx number when refilled electronically. Your pharmacy may indicate that no refills were authorized even though a new prescription for the same medication is available at the pharmacy. Please request the medicine by name with the pharmacy before contacting your provider for a refill.           Medical Referral Source Access Code: Activation code not generated  Current Medical Referral Source Status: Active

## 2017-01-05 NOTE — WOUND TEAM
Advanced Wound Care  [facility]  [address]  [address]  [place] [address]  [phone] Fax: [phone]      Encounter note  For Certification Period: 12/26/2016 - 01/26/2017      Referring Physician: Abhishek Edwards MD  Primary Physician: Kong Jha MD  Consulting Physicians:         Wound(s): L foot medial and dorsal foot incisions  Start of Care: 12/26/16       Subjective:        HPI: 61 year old gentleman with a hx of HIV+ and IDDM , Charcot deformity and a navicular fx.  Pt underwent ORIF and gastrocnemius recession as well as an ankle fusion.  S/p I/D dehisced area of his medial foot incision with NPWT wound VAC placed post-operatively on 7/16/16.  Pt DC from [facility] 7/20/16.    Patient wound had matristem placed for 9 sessions and closure of wound on 11/16/2016.  Patient recently felt feverish and leg pain on 12/10/16, went to [facility] and discharged 12/13. Was seen at wound clinic following discharge. S/p Left foot removal of hardware, I & D, ankle arthrotomy by Dr. Edwards on 12/22/16. Discharged from [facility] on 12/24 referred to [facility] for treatment of the incisions.      Pain:  5/10 on 0-10 pain scale to left foot, pt took own oral pain meds prior to visit    Past Medical History:  Past Medical History   Diagnosis Date   • HIV (human immunodeficiency virus infection) ([facility])    • Diabetes ([facility])    • Neuropathy ([facility])    • Arthritis    • Renal disorder      5/4/2016,pt states not aware of any renal disorder   • Sleep apnea      diagnosed with sleep apnea,has cpap,lost 60 lbs,uses cpap sometimes   • Back pain      5/24/2016 states back pain not an issue at this time   • Bronchitis    • Depression    • Chickenpox    • Mumps    • Tonsillitis    • Whooping cough        Current Medications:   coumadin restarted, taking augmentin for 14 days            Allergies:   Allergies   Allergen Reactions   • Oxycodone      Hallucination  Rxn = May 2016           Objective:      Tests and  Measures:  Left Foot warm  +1 DP pulse, confirmed with doppler, audible DP and PT pulse strongly    Orthotic, protective, supportive devices: offloading boot, Walker, knee scooter    Fall Risk Assessment (edward all that apply with an X): Completed 12/26/2016   65 years or older     xFall within the last 2 years,    xAmbulatory devices, FWW  Loss of protective sensation in feet,   xUse of prostethic/orthotic, years    Presence of lower extremity/foot/toe amputation   xTaking medication that increases risk (per facility policy)    Interventions Recommended (if any of the above are selected):   xUse of Assistive Device:_________fww______________   Supervision with ambulation:  Caregiver   xAssistance with ambulation:  Caregiver   Bashir safety education:  Educational material provided         Wound Characteristics                                                    Location:  Left Foot dorsal incision  Initial Evaluation  Date: 12/26/2016 Encounter date   1/4/17   Tissue Type and %: Incision with sutures with pinpoint opening Incision dehiscing, dark red tissue to wound bed that is visible     Periwound: intact Intact with sutures, erythema   Drainage: heavy serosanguinous with tissue flecks with epression Heavy old clotted blood and pale white chunky debris expressed   Exposed structures None none   Wound Edges:   Approximated except for opening Opening between sutures    Odor: None none   S&S of Infection:   None erythema   Edema: +3 pitting +3   Sensation: intact intact               Measurements:  L dorsal incision Initial Evaluation  Date: 12/26/2016 Encounter note  Date: 1/4/17   Length (cm) 6.5 1.0   Width (cm) 0.3 0.5   Depth (cm) 0.6 1.0   Area (cm2) 1.95 2.75 cm2   Tract/undermine 0.6 4 cm from 11:00       Location:  Left Foot Medial Midfoot incision Initial Evaluation  Date: 12/26/2016 Encounter date 1/4/17   Tissue Type and %: Incision approximated with sutures except for pinpoint opening  Incision dehiscing,  "dark red tissue to wound bed that is visible     Periwound: Slight maceration Intact with sutures, erythema, edema   Drainage: Heavy serosanguinous with tissue flecks with expression Mod/heavy red/tan and purulent exudate   Exposed structures None none   Wound Edges:   Approximated except for opening Open   Odor: None none   S&S of Infection:   None Edema,erythema, exudate, dehiscence   Edema: +3 pitting 4+   Sensation: intact intact               Measurements:  L medial foot incision  Initial Evaluation  Date: 2016 Encounter note  Date: 17   Length (cm) 9.5 3 areas of dihisence along suture line   Width (cm) 0.2    Depth (cm) 0.9    Area (cm2) 1.9    Tract/undermine 0.9  4-6 cm from 6:00 to 3:00     1/3/17:  Measurements not done due to time constraints with scheduling    Procedures:      Debridement :  selective debridement with surgilav and non selectively with NS moisten gauze and cotton tip applicator- \" milking\" of tissue to express thick red/tan and purulent exudate. Forceps to remove non-viable tissue, slough. Area debrided approx 2cm2   Cleansed with:   NSaline 500mll                                                                    Periwound protected with: barrier paste   Primary dressin/4\" plain strip packing to all tracts to both incisions. On long piece medially and 2 laterally   Secondary Dressing: abd pads, kerlix   Other: tubigrip G-holding boot as edges place direct pressure on med/lateral foot     Patient Education: 17:  Discussed S&S of infection.  Pt to watch area or erythema on ankle- if it rises pt needs to seek medical attn. ASAP for possible IV abx.  Pt to come to Dr. Tijerina's round 17 to have them assess foot.  Pt to have consult with Dr. Vergara on 17.  Dr. Edwards returns 17.    1/3/17:  Today continue POC due to similar findings. Discussed amputation issues briefly. Pt seeing Dr. Vergara tomorrow.   17: VAC held for this treatment d/t increase in " thick clotted debris. pt to come in tomorrow for dressing change and surgilav. Pt verbalized understanding, updated on change in plan of care. Pt has continual ankle pain, has appt with Dr. Vergara to discuss quality of life as amputee/process of amputation tomorrow afternoon    Professional Collaboration:   1/4/17:  Discussed situation with Mariam PRAKASH at length this am.  Pt on rounds for 1-6-17. Amputation has been and will be discussed.   Assessment:      Wound etiology: surgical     Wound Progress:  Increased depth to wounds- continued purulence and necrotic tissue copiously emerging from wound    Rationale for Treatment: plain strip to assist with removal of debris, abd pads to manage exudate    Patient tolerance/compliance: Patient tolerated treatment well.    Complicating factors:  HIV+,  DM2    Need for ongoing Advanced Wound Care services:Patient requires skilled therapeutic wound care services for product selection, application of product, debridement, close monitoring with clinical assessment for expedite of wound healing.       Plan:      Treatment Plan and Recommendations:  Selective, non selective debridement       Frequency: 3x/weekly      Treatment Goals: STG 2 Weeks  LTG 4 Weeks   Granulation Tissue: n/a n/a   Decrease Necrotic Tissue to: n/a n/a   Wound Phase:  n/a  n/a   Decrease Size by: Decrease drainage by 50% Decrease drainage by 100%   Periwound:  intact intact   Decrease tracts/undermining by: 10% 20%   Decrease Pain:  4/10 0/10       At the time of each visit a thorough assessment of the patient is completed to assure the  appropriateness of our plan of care.  The dressings or modalities may need to be adapted   from the original plan to address any significant changes in the wound environment.          Clinician Signature:_______________________________Date__________________      Physician Signature:______________________________Date:__________________

## 2017-01-05 NOTE — PROGRESS NOTES
Subjective:      Edward Ohara is a 61 y.o. male who presents with Rash            HPI Comments: Medications, Allergies and Prior Medical Hx reviewed and updated in [facility].with patient/family today     Patient states he was in the hospital recently. During that time he developed a rash around his rectal area. Patient states she has a history of herpes infection. This feels like his previous herpes infections in the past he has used acyclovir.    Rash  This is a new problem. The current episode started in the past 7 days. The affected locations include the left buttock and right buttock. The rash is characterized by blistering. He was exposed to nothing. Pertinent negatives include no fever or vomiting. Past treatments include nothing. The treatment provided no relief.       Review of Systems   Constitutional: Negative for fever and chills.   Gastrointestinal: Negative for nausea, vomiting and abdominal pain.   Musculoskeletal: Negative for myalgias.   Skin: Positive for rash. Negative for itching.   Neurological: Negative for headaches.          Objective:     /62 mmHg  Pulse 94  Temp(Src) 36.7 °C (98 °F)  Resp 16  Wt 81.647 kg (180 lb)  SpO2 95%     Physical Exam   Constitutional: He appears well-developed and well-nourished. No distress.   HENT:   Head: Normocephalic and atraumatic.   Eyes: Conjunctivae are normal. Pupils are equal, round, and reactive to light.   Neck: Neck supple.   Cardiovascular: Normal rate.    Pulmonary/Chest: Effort normal. No respiratory distress.   Genitourinary:         Vesicular rash with erythema   Neurological: He is alert.   Awake, alert, answering questions appropriately, moving all extremeties   Skin: Skin is warm and dry. No rash noted.   Psychiatric: He has a normal mood and affect. His behavior is normal.   Vitals reviewed.              Assessment/Plan:       1. Herpes simplex  acyclovir (ZOVIRAX) 800 MG Tab   2. Rash  hydrocortisone acetate 0.5 % cream        Pt also requested a refill on hydrocortisone for a recurrent rash on his foot and back    Pt will go to the ER for worsening or changing symptoms as discussed,   Follow-up with your primary care provider or return here if not improving   Discharge instructions discussed with pt/family who verbalize understanding and agreement with poc    .[email or web address]

## 2017-01-05 NOTE — IP AVS SNAPSHOT
" After Visit Summary                                                                                                                  Name:Edward Vergarafield  Medical Record Number:[id number]  CSN: [id number]    YOB: 1955   Age: 61 y.o.  Sex: male  HT:1.778 m (5' 10\") WT: 83.8 kg (184 lb 11.9 oz)          Admit Date: 1/5/2017     Discharge Date:   Today's Date: 1/6/2017  Attending Doctor:  Pedro Luis Painting M.D.                  Allergies:  Oxycodone            Discharge Instructions       Discharge Instructions    Discharged to home by car with relative. Discharged via wheelchair, hospital escort: Yes.  Special equipment needed: Not Applicable    Be sure to schedule a follow-up appointment with your primary care doctor or any specialists as instructed.     Discharge Plan:   Diet Plan: Discussed (Diabetic)  Activity Level: Discussed (As tolerated)  Confirmed Follow up Appointment: Patient to Call and Schedule Appointment  Confirmed Symptoms Management: Discussed  Medication Reconciliation Updated: Yes  Influenza Vaccine Indication: Not indicated: Previously immunized this influenza season and > 8 years of age    I understand that a diet low in cholesterol, fat, and sodium is recommended for good health. Unless I have been given specific instructions below for another diet, I accept this instruction as my diet prescription.   Other diet: Diabetic    Special Instructions: None    · Is patient discharged on Warfarin / Coumadin?   No     · Is patient Post Blood Transfusion?  No    Depression / Suicide Risk    As you are discharged from this [facility] facility, it is important to learn how to keep safe from harming yourself.    Recognize the warning signs:  · Abrupt changes in personality, positive or negative- including increase in energy   · Giving away possessions  · Change in eating patterns- significant weight changes-  positive or negative  · Change in sleeping patterns- unable to sleep or " sleeping all the time   · Unwillingness or inability to communicate  · Depression  · Unusual sadness, discouragement and loneliness  · Talk of wanting to die  · Neglect of personal appearance   · Rebelliousness- reckless behavior  · Withdrawal from people/activities they love  · Confusion- inability to concentrate     If you or a loved one observes any of these behaviors or has concerns about self-harm, here's what you can do:  · Talk about it- your feelings and reasons for harming yourself  · Remove any means that you might use to hurt yourself (examples: pills, rope, extension cords, firearm)  · Get professional help from the community (Mental Health, Substance Abuse, psychological counseling)  · Do not be alone:Call your Safe Contact- someone whom you trust who will be there for you.  · Call your local CRISIS HOTLINE 938-8971 or 825-009-2259  · Call your local Children's Mobile Crisis Response Team [place] (533) 396-1398 or www.[employer]  · Call the toll free National Suicide Prevention Hotlines   · National Suicide Prevention Lifeline 564-979-WMEC (0441)  · National Hope Line Network 800-SUICIDE (761-5643)        Your appointments     Jan 09, 2017  9:00 AM   Wound 60 Minute Procedure with Nancy Mancilla R.N.   [facility] ([address] [place] [address])    [address]  [place] [id number] [phone]            Jan 11, 2017  8:00 AM   Wound 60 Minute Procedure with Briana Stallworth R.N.   [facility] ([address] [place] [address])    [address]  [place] [address]   [phone]            Jan 12, 2017  1:00 PM   Follow UP with Abdullahi Xavier M.D.   [facility] [place] [facility] (--)    [address]  [place] [id number] [phone]            Jan 13, 2017  8:15 AM   Wound 30 Minute Procedure with Sonido Tijerina M.D., Briana Stallworth R.N.   [facility] ([address] [place] [address])    [address]  [place] [address]   [phone]            Jan 16, 2017  7:00 AM   Wound  30 Minute Procedure with Marianna Leyva R.N.   [facility] ([address] [place] [address])    [address]  [place] [address]   [phone]            Jan 16, 2017  7:30 AM   FOOT AND NAIL CARE INITIAL EVAL with Marianna Leyva R.N.   [facility] ([address] [place] [address])    [address]  [place] [address]   [phone]            Jan 18, 2017  8:00 AM   Wound 60 Minute Procedure with Nancy Mancilla R.N.   [facility] ([address] [place] [address])    [address]  [place] [address]   [phone]            Jan 20, 2017  9:00 AM   Wound 30 Minute Procedure with Suki Hurt P.T.   [facility] ([address] [place] [address])    [address]  [place] [address]   [phone]            Jan 23, 2017  8:00 AM   Wound 60 Minute Procedure with Petey Abel R.N.   [facility] ([address] [place] [address])    [address]  [place] [address]   [phone]            Jan 25, 2017  8:30 AM   Wound 60 Minute Procedure with Ebony Sanabria P.T.   [facility] ([address] [place] [address])    [address]  [place] [address]   [phone]            Jan 27, 2017 10:00 AM   Wound 60 Minute Procedure with Grace Watson R.N.   [facility] ([address] [place] [address])    [address]  [place] [address]   [phone]            Apr 26, 2017  1:00 PM   Diabetes Care Visit with Kacie Weston M.D., ENDOCRINOLOGY DIABETES RN   [facility])    [address]  [place] [address] [phone]           You will be receiving a confirmation call a few days before your appointment from our automated call confirmation system.              Follow-up Information     1. Follow up with Sonido Tijerina M.D..    Specialty:  Orthopaedics    Contact information    [address]  [address]  [place] [address]  [phone]           Discharge Medication Instructions:    Below are the medications your physician expects you to take upon discharge:    Review all your home  "medications and newly ordered medications with your doctor and/or pharmacist. Follow medication instructions as directed by your doctor and/or pharmacist.    Please keep your medication list with you and share with your physician.               Medication List      CHANGE how you take these medications        Instructions    acyclovir 800 MG Tabs   What changed:    - how much to take  - when to take this   Commonly known as:  ZOVIRAX    Take 1 Tab by mouth 2 times a day.   Dose:  800 mg         CONTINUE taking these medications        Instructions    acetaminophen 500 MG Tabs   Commonly known as:  TYLENOL    Take 1,000 mg by mouth every 6 hours as needed.   Dose:  1000 mg       amoxicillin-clavulanate 875-125 MG Tabs   Commonly known as:  AUGMENTIN    Take 1 Tab by mouth 2 times a day for 14 days.   Dose:  1 Tab       emtricitabine-tenofovir 200-300 MG per tablet   Last time this was given:  1 Tab on 1/6/2017  8:48 AM   Commonly known as:  TRUVADA    Take 1 Tab by mouth every day.   Dose:  1 Tab       HUMULIN R 100 Unit/mL Soln   Generic drug:  insulin regular    Inject 5-30 Units as instructed 3 times a day before meals. Pt states \"I use a sliding scale\".   Dose:  5-30 Units       HYDROmorphone 2 MG Tabs   Last time this was given:  2 mg on 1/6/2017 12:09 PM   Commonly known as:  DILAUDID    Take 1 Tab by mouth every 3 hours as needed.   Dose:  2 mg       Iron 325 (65 FE) MG Tabs   Last time this was given:  325 mg on 1/6/2017  1:37 PM    Take 1 Tab by mouth 3 times a day, with meals.   Dose:  1 Tab       metformin 1000 MG tablet   Last time this was given:  1,000 mg on 1/6/2017  8:48 AM   Commonly known as:  GLUCOPHAGE    Take 1,000 mg by mouth 2 times a day, with meals.   Dose:  1000 mg       morphine 15 MG tablet   Commonly known as:  MS IR    Take 1 Tab by mouth every four hours as needed for Severe Pain.   Dose:  15 mg       multivitamin Tabs   Last time this was given:  2 Tabs on 1/6/2017  8:48 AM    Take 2 " "Tabs by mouth every day. Indications: pt has been taking \"for years\"   Dose:  2 Tab       NOVOLIN N RELION 100 UNIT/ML Susp   Last time this was given:  30 Units on 1/6/2017  6:13 AM   Generic drug:  insulin NPH    Inject 40 Units as instructed 2 Times a Day. Pt takes:  30 units at 1200 30 units HS   Dose:  40 Units       paroxetine 20 MG Tabs   Last time this was given:  20 mg on 1/6/2017  8:48 AM   Commonly known as:  PAXIL    Take 1 Tab by mouth every day.   Dose:  20 mg       raltegravir 400 MG Tabs   Last time this was given:  400 mg on 1/6/2017  8:48 AM   Commonly known as:  ISENTRESS    Take 1 Tab by mouth 2 Times a Day.   Dose:  400 mg       sulfamethoxazole-trimethoprim 800-160 MG tablet   Last time this was given:  1 Tab on 1/6/2017  8:48 AM   Commonly known as:  BACTRIM DS    Take 1 Tab by mouth every day.   Dose:  1 Tab       warfarin 5 MG Tabs   Commonly known as:  COUMADIN    Take 5-7.5 mg by mouth every day. Pt takes: 5MG on Sun, Tue, Thur, and Sat 7.5MG on Mon, Wed, and Fri   Dose:  5-7.5 mg               Instructions           Diet / Nutrition:    Follow any diet instructions given to you by your doctor or the dietician, including how much salt (sodium) you are allowed each day.    If you are overweight, talk to your doctor about a weight reduction plan.    Activity:    Remain physically active following your doctor's instructions about exercise and activity.    Rest often.     Any time you become even a little tired or short of breath, SIT DOWN and rest.    Worsening Symptoms:    Report any of the following signs and symptoms to the doctor's office immediately:    *Pain of jaw, arm, or neck  *Chest pain not relieved by medication                               *Dizziness or loss of consciousness  *Difficulty breathing even when at rest   *More tired than usual                                       *Bleeding drainage or swelling of surgical site  *Swelling of feet, ankles, legs or stomach             "     *Fever (>100ºF)  *Pink or blood tinged sputum  *Weight gain (3lbs/day or 5lbs /week)           *Shock from internal defibrillator (if applicable)  *Palpitations or irregular heartbeats                *Cool and/or numb extremities    Stroke Awareness    Common Risk Factors for Stroke include:    Age  Atrial Fibrillation  Carotid Artery Stenosis  Diabetes Mellitus  Excessive alcohol consumption  High blood pressure  Overweight   Physical inactivity  Smoking    Warning signs and symptoms of a stroke include:    *Sudden numbness or weakness of the face, arm or leg (especially on one side of the body).  *Sudden confusion, trouble speaking or understanding.  *Sudden trouble seeing in one or both eyes.  *Sudden trouble walking, dizziness, loss of balance or coordination.Sudden severe headache with no known cause.    It is very important to get treatment quickly when a stroke occurs. If you experience any of the above warning signs, call 911 immediately.                   Disclaimer         Quit Smoking / Tobacco Use:    I understand the use of any tobacco products increases my chance of suffering from future heart disease or stroke and could cause other illnesses which may shorten my life. Quitting the use of tobacco products is the single most important thing I can do to improve my health. For further information on smoking / tobacco cessation call a Toll Free Quit Line at 1-621.528.6116 (*National Cancer [place]) or 1-584.702.9998 (American Lung Association) or you can access the web based program at www.lungusa.org.    [place] Tobacco Users Help Line:  (719) 627-8458       Toll Free: 1-159.423.2948  Quit Tobacco Program [facility] (792)272-5389    Crisis Hotline:    [place] Crisis Hotline:  1-922-YTXWLJQ or 1-210.733.1499    [place] Crisis Hotline:    1-674.510.8312 or 271-934-7976    Discharge Survey:   Thank you for choosing [facility]. We hope we did everything we could to make your  hospital stay a pleasant one. You may be receiving a phone survey and we would appreciate your time and participation in answering the questions. Your input is very valuable to us in our efforts to improve our service to our patients and their families.        My signature on this form indicates that:    1. I have reviewed and understand the above information.  2. My questions regarding this information have been answered to my satisfaction.  3. I have formulated a plan with my discharge nurse to obtain my prescribed medications for home.                  Disclaimer         __________________________________                     __________       ________                       Patient Signature                                                 Date                    Time

## 2017-01-06 ENCOUNTER — OFFICE VISIT (OUTPATIENT)
Dept: WOUND CARE | Facility: MEDICAL CENTER | Age: 62
End: 2017-01-06
Attending: ORTHOPAEDIC SURGERY
Payer: MEDICARE

## 2017-01-06 VITALS
HEART RATE: 77 BPM | OXYGEN SATURATION: 96 % | HEIGHT: 70 IN | DIASTOLIC BLOOD PRESSURE: 60 MMHG | WEIGHT: 184.75 LBS | BODY MASS INDEX: 26.45 KG/M2 | TEMPERATURE: 98.1 F | SYSTOLIC BLOOD PRESSURE: 120 MMHG | RESPIRATION RATE: 18 BRPM

## 2017-01-06 PROBLEM — R41.0 CONFUSION: Status: RESOLVED | Noted: 2017-01-05 | Resolved: 2017-01-06

## 2017-01-06 LAB
AMMONIA PLAS-SCNC: 24 UMOL/L (ref 11–45)
GLUCOSE BLD-MCNC: 121 MG/DL (ref 65–99)
GLUCOSE BLD-MCNC: 256 MG/DL (ref 65–99)
MAGNESIUM SERPL-MCNC: 1.6 MG/DL (ref 1.5–2.5)
TSH SERPL DL<=0.005 MIU/L-ACNC: 3.8 UIU/ML (ref 0.3–3.7)

## 2017-01-06 PROCEDURE — 86360 T CELL ABSOLUTE COUNT/RATIO: CPT

## 2017-01-06 PROCEDURE — 83735 ASSAY OF MAGNESIUM: CPT

## 2017-01-06 PROCEDURE — 36415 COLL VENOUS BLD VENIPUNCTURE: CPT

## 2017-01-06 PROCEDURE — 99217 PR OBSERVATION CARE DISCHARGE: CPT | Performed by: HOSPITALIST

## 2017-01-06 PROCEDURE — 84443 ASSAY THYROID STIM HORMONE: CPT

## 2017-01-06 PROCEDURE — A9270 NON-COVERED ITEM OR SERVICE: HCPCS | Performed by: HOSPITALIST

## 2017-01-06 PROCEDURE — 96372 THER/PROPH/DIAG INJ SC/IM: CPT

## 2017-01-06 PROCEDURE — 86359 T CELLS TOTAL COUNT: CPT

## 2017-01-06 PROCEDURE — 700102 HCHG RX REV CODE 250 W/ 637 OVERRIDE(OP): Performed by: HOSPITALIST

## 2017-01-06 PROCEDURE — 82140 ASSAY OF AMMONIA: CPT

## 2017-01-06 PROCEDURE — G0378 HOSPITAL OBSERVATION PER HR: HCPCS

## 2017-01-06 PROCEDURE — 82962 GLUCOSE BLOOD TEST: CPT

## 2017-01-06 RX ORDER — ACYCLOVIR 200 MG/1
400 CAPSULE ORAL 3 TIMES DAILY
Status: DISCONTINUED | OUTPATIENT
Start: 2017-01-06 | End: 2017-01-06 | Stop reason: HOSPADM

## 2017-01-06 RX ORDER — ACYCLOVIR 200 MG/1
400 CAPSULE ORAL ONCE
Status: COMPLETED | OUTPATIENT
Start: 2017-01-06 | End: 2017-01-06

## 2017-01-06 RX ADMIN — ACYCLOVIR 400 MG: 200 CAPSULE ORAL at 08:48

## 2017-01-06 RX ADMIN — EMTRICITABINE AND TENOFOVIR DISOPROXIL FUMARATE 1 TABLET: 200; 300 TABLET, FILM COATED ORAL at 08:48

## 2017-01-06 RX ADMIN — FERROUS SULFATE TAB 325 MG (65 MG ELEMENTAL FE) 325 MG: 325 (65 FE) TAB at 13:37

## 2017-01-06 RX ADMIN — THERA TABS 2 TABLET: TAB at 08:48

## 2017-01-06 RX ADMIN — INSULIN LISPRO 3 UNITS: 100 INJECTION, SOLUTION INTRAVENOUS; SUBCUTANEOUS at 06:13

## 2017-01-06 RX ADMIN — HYDROMORPHONE HYDROCHLORIDE 2 MG: 2 TABLET ORAL at 12:09

## 2017-01-06 RX ADMIN — FERROUS SULFATE TAB 325 MG (65 MG ELEMENTAL FE) 325 MG: 325 (65 FE) TAB at 08:48

## 2017-01-06 RX ADMIN — PAROXETINE HYDROCHLORIDE 20 MG: 20 TABLET, FILM COATED ORAL at 08:48

## 2017-01-06 RX ADMIN — METFORMIN HYDROCHLORIDE 1000 MG: 500 TABLET, FILM COATED ORAL at 08:48

## 2017-01-06 RX ADMIN — SULFAMETHOXAZOLE AND TRIMETHOPRIM 1 TABLET: 800; 160 TABLET ORAL at 08:48

## 2017-01-06 RX ADMIN — ACYCLOVIR 400 MG: 200 CAPSULE ORAL at 01:19

## 2017-01-06 RX ADMIN — RALTEGRAVIR 400 MG: 400 TABLET, FILM COATED ORAL at 08:48

## 2017-01-06 RX ADMIN — INSULIN HUMAN 30 UNITS: 100 INJECTION, SUSPENSION SUBCUTANEOUS at 06:13

## 2017-01-06 RX ADMIN — HYDROMORPHONE HYDROCHLORIDE 2 MG: 2 TABLET ORAL at 09:08

## 2017-01-06 NOTE — H&P
PRIMARY CARE:  Brennan Boyce M.D.    PRIOR ORTHOPEDIST:  Abhishek Edwards M.D.    CURRENT PRIMARY CARE PROVIDER:  Kong Jha M.D.    CHIEF COMPLAINT:  Weakness, questionable confusion this morning.    HISTORY OF PRESENT ILLNESS:  The patient is a 61-year-old male.  He has a   history of HIV.  His last CD4 count was 13.  Patient this morning was transferring   to the bathroom via a scooter, went to get on to the toilet, but states   he gently went down to the ground, feeling weak.  He is unsure why he went to the   ground.  He states he was not dizzy, not lightheaded, but states just weak.    He denies any sudden falls or hitting of his head.  He denies any seizure   activity.  He states he had not eaten, but did not feel like he had low blood   sugars.  His significant other was in the same house and heard him call out   his name.  He went over to see him, found him on the floor.  At this point in   time, he went to go try and pick him up; however, patient was kind of limp,   unruly and not assisting in helping getting him up, which was abnormal.  He   states he has given him commands on what to do; however, the patient just   simply was not doing it for questionable reason why.  He felt that the patient   was confused at this point in time.  Patient states he was alert and oriented   and the significant other did agree to that, states he was alert and   oriented.  Patient denied any dizziness.  He did not eat that morning.  He   states he did not have any diarrhea.  He has had some bladder incontinence,   which has been ongoing.  He has a chronic infection on the left foot for which   Dr. Edwards and wound care have been assessing.  Patient denies any fevers,   but he has had some chills recently.  He denies any diarrhea, blood in the   stool or any other wounds other than the left foot.  Patient does feel not   quite himself.  He is alert and oriented and is able to answer questions at   hand, but  sometimes little slow and off.  The significant other is concerned   about current confusion.    The patient denied any new medications.  However, on further discussions, he   had recently started up marijuana this past week over the last 3 days.  He   denies this is being an issue that is causing his confusion.  He denies taking   any narcotics as well.    REVIEW OF SYSTEMS:  As stated above in HPI, otherwise unremarkable per CMS/AMA   criteria.    ALLERGIES:  None.    CURRENT MEDICATIONS:  Meds were reviewed with the patient.  He was alert   enough to go over these.  1.  Tylenol 500 mg 1-2 tablets every 6 hours as needed.  2.  Acyclovir 800 mg twice a day.  3.  Augmentin 875/125 one tablet twice a day for 14 days.  4.  Truvada 200/300 mg daily.  5.  Iron sulfate 325 mg 3 times a day.  6.  Dilaudid 1 tablet every 3 hours as needed.  7.  NPH 30 units once at noon and then once at bedtime.  8.  Humulin per sliding scale.  9.  Metformin 1000 mg twice a day.  10.  Morphine instant release ____ mg every 4 hours as needed for severe pain.    Patient states he has not taken this recently.  11.  Multivitamin once a day.  12.  Paxil 20 mg daily.  13.  Raltegravir 400 mg twice a day.  14.  Bactrim 800/160 daily.  15.  Warfarin.    PAST MEDICAL HISTORY:  1.  History of sleep apnea.  2.  HIV/ AIDS.  3.  Diabetes, insulin requiring.  4.  Neuropathy.  5.  Chronic back pain.    PAST SURGICAL HISTORY:  1.  He has had open reduction and internal fixation, ankle fusion, I and D at   the left ankle secondary to osteomyelitis.  2.  Carpal tunnel release.  3.  Appendectomy.    SOCIAL HISTORY:  He is homosexual.  Significant other is at bedside.  He had   no children.    FAMILY HISTORY:  Mother had heart attack.  Father had heart disease as well.    Both parents had diabetes.  Both parents are .    PHYSICAL EXAMINATION:  VITAL SIGNS:  Temperature is 98, heart rate of 87, respirations 16, blood   pressure 137/86 and oxygen is  97%.  GENERAL:  This is a well-nourished white male.  He is awake, alert and   conversant.  Speech is clear.  HEENT:  NCAT, EOMI, PERRL, sclerae are anicteric.  Oral mucosa.  No oral   lesions.  Mallampati score 3.  NECK:  Trachea is midline.  There is no adenopathy.  No palpable tenderness.    No stridor on auscultation.  LUNGS:  Clear to auscultation bilaterally.  There are no wheezes, no crackles   and no accessory muscle use.  CARDIOVASCULAR:  Regular rate and rhythm.  He does have a 3/6 systolic murmur.  ABDOMEN:  Soft, nontender and nondistended.  Positive bowel sounds.  EXTREMITIES:  No lower extremity edema, 2+ peripheral pulses.  Moves all   extremities.  Good fine motor control.  He does have left chronic foot wound.    Medial side has been packed, chronic erythema on the ankle area with some   warmth.  No discharge noted.    NEUROLOGIC:  Cranial nerves II-XII grossly intact.    LABORATORY DATA:  CBC shows a white blood cell count of 8, hemoglobin 8.1,   hematocrit 26.3, platelet count is 584, MCV of 78 and MCH of 24.2.  Chemistry   panel shows a sodium of 132, glucose 164, otherwise unremarkable.  Alkaline   phosphatase is elevated at 178, albumin 2.8 and magnesium 1.6.  Ammonia level   is 24, lactic acid 1.1, troponin less than 0.01.  BNP of 65.  Urinalysis shows   50 of protein, otherwise unremarkable.    IMAGING:  CT of the head showed normal CT of the head without contrast.  Chest   x-ray, January 5 shows no acute cardiopulmonary process.  EKG showed a   ventricular rate of 89, sinus rhythm.  There are flipped T waves in leads III,   flattening in aVF.    ASSESSMENT AND PLAN:  1.  Episodic confusion of unclear etiology.  I have checked ammonia level was   already back and normal.  Questionable absence seizures versus other   neurologic disorder.  Holding off an MRI of the brain right now as he is   otherwise intact.  He does have a low CD4 count per prior labs.  AIDS by   definition with his low CD4  count.  We will go ahead and check an MRI of the   brain.  Check thyroid test and continue to monitor neurologically.  Question   this is secondary to recent medications.  He denies any overt pain   medications.  Recently took marijuana without issue.  He denies this as being   the problem.  2.  Recent ongoing left foot infection/osteomyelitis.  He follows up with Dr. Olivarez, wound care.  I have already assessed the wound on the left foot.    There are intact sutures to those packing in the area.  There is some erythema   and warmth to the ankle area.  Patient states this is unchanged.  3.  Acquired immunodeficiency syndrome/human immunodeficiency virus.  Continue   with antiviral medications.  Consult Dr. Boyce as needed.  4.  Chronic pain.  Continue with outpatient medications for ongoing active   treatment.       ____________________________________     DO DANIEL ACOSTA / MARISA    DD:  01/06/2017 02:03:09  DT:  01/06/2017 03:27:59    D#:  [id number]  Job#:  [id number]    cc: FIGUEROA BOYCE MD, VOLODYMYR OLIVAREZ MD, MAXI GUILLERMO MD

## 2017-01-06 NOTE — PROGRESS NOTES
Pt has d/c orders.  MD updated pt has not had MRI brain or been seen by limb preservation RN.  MRI does not have to be done per Dr. Painting.  Will wait for limb preservation RN to see pt to assess wound before d/c.  Wound called and RN ext provided for call back.

## 2017-01-06 NOTE — ED NOTES
Edward Marco A [place]  61 y.o.  male  Chief Complaint   Patient presents with   • Weakness     Brought in by alfredo from home. Per report patient c/o sudden onset of weakness at around 1530. Patient went to bathroom and fell due to weakness. Left foot surgery noted. Per patient pins was removed last Dec 22 nd. Denies hitting head, denies loc. Denies neck/ back pain.

## 2017-01-06 NOTE — DISCHARGE INSTRUCTIONS
Discharge Instructions    Discharged to home by car with relative. Discharged via wheelchair, hospital escort: Yes.  Special equipment needed: Not Applicable    Be sure to schedule a follow-up appointment with your primary care doctor or any specialists as instructed.     Discharge Plan:   Diet Plan: Discussed (Diabetic)  Activity Level: Discussed (As tolerated)  Confirmed Follow up Appointment: Patient to Call and Schedule Appointment  Confirmed Symptoms Management: Discussed  Medication Reconciliation Updated: Yes  Influenza Vaccine Indication: Not indicated: Previously immunized this influenza season and > 8 years of age    I understand that a diet low in cholesterol, fat, and sodium is recommended for good health. Unless I have been given specific instructions below for another diet, I accept this instruction as my diet prescription.   Other diet: Diabetic    Special Instructions: None    · Is patient discharged on Warfarin / Coumadin?   No     · Is patient Post Blood Transfusion?  No    Depression / Suicide Risk    As [facility] are [facility], it is important to learn how to keep safe from harming yourself.    Recognize the warning signs:  · Abrupt changes in personality, positive or negative- including increase in energy   · Giving away possessions  · Change in eating patterns- significant weight changes-  positive or negative  · Change in sleeping patterns- unable to sleep or sleeping all the time   · Unwillingness or inability to communicate  · Depression  · Unusual sadness, discouragement and loneliness  · Talk of wanting to die  · Neglect of personal appearance   · Rebelliousness- reckless behavior  · Withdrawal from people/activities they love  · Confusion- inability to concentrate     If you or a loved one observes any of these behaviors or has concerns about self-harm, here's what you can do:  · Talk about it- your feelings and reasons for harming yourself  · Remove any means  that you might use to hurt yourself (examples: pills, rope, extension cords, firearm)  · Get professional help from the community (Mental Health, Substance Abuse, psychological counseling)  · Do not be alone:Call your Safe Contact- someone whom you trust who will be there for you.  · Call your local CRISIS HOTLINE 350-7812 or 513-756-5448  · Call your local Children's Mobile Crisis Response Team [place] (609) 318-4986 or www.[employer]  · Call the toll free National Suicide Prevention Hotlines   · National Suicide Prevention Lifeline 503-612-UQJC (3017)  · National Hope Line Network 800-SUICIDE (661-9882)

## 2017-01-06 NOTE — PROGRESS NOTES
Report received, assumed patient care.  Pt A&OX4.  Assessment completed.  Call light within reach, personal belongings available, bed in lowest position, pt calling for assistance.  Pt reports no pain.  Drsg to LLE with slight drainage present.  Communication board updated, POC discussed.  VSS.  No additional needs at this time.

## 2017-01-06 NOTE — ED PROVIDER NOTES
"ED Provider Note    Scribed for Goyo Stewart M.D. by Nidia Vargas. 1/5/2017, 7:49 PM.    Primary care provider: Kong Jha M.D.  Means of arrival: EMS  History obtained from: Patient  History limited by: None    CHIEF COMPLAINT  Chief Complaint   Patient presents with   • Weakness     HPI  Edward Ohara is a 61 y.o. male who presents to the Emergency Department by ambulance for further evaluation of confusion and generalized weakness. The patient felt well prior to going to sleep tonight. He woke from sleep to use the restroom and fell secondary to weakness or possible confusion, prompting his visit to the ED. He denies hitting his head and loss of consciousness. He currently denies any newfound symptoms, including headache, vision changes, hearing changes, cough, rhinorrhea, congestion, chest pain, shortness of breath and dysuria. Per partner at bedside, the patient has had a couple of similar episodes over the past two weeks. The difference with ciro's episode was the patient \"would not let\" him help him get up after falling and appeared to be confused. He recently had surgery performed in December by Dr. Edwards for osteomyelitis to his left foot. He is currently alert and oriented.     REVIEW OF SYSTEMS  Review of Systems   Constitutional:        Positive for confusion.    HENT: Negative for congestion.    Eyes: Negative for blurred vision.   Respiratory: Negative for cough and shortness of breath.    Cardiovascular: Negative for chest pain.   Genitourinary: Negative for dysuria.   Neurological: Positive for weakness. Negative for loss of consciousness and headaches.   All other systems reviewed and are negative.      PAST MEDICAL HISTORY   has a past medical history of HIV (human immunodeficiency virus infection) (HCC); Diabetes (HCC); Neuropathy (HCC); Arthritis; Renal disorder; Sleep apnea; Back pain; Bronchitis; Depression; Chickenpox; Mumps; Tonsillitis; and Whooping " cough.    SURGICAL HISTORY   has past surgical history that includes other abdominal surgery (1969); other orthopedic surgery; other orthopedic surgery (Right, 1967); other orthopedic surgery (Bilateral, 1994); other (1974); other (1972); other (1977); ankle orif (Left, 5/26/2016); ankle fusion (Left, 5/26/2016); irrigation & debridement ortho (Left, 7/16/2016); carpal tunnel release; sinuscope; appendectomy; irrigation & debridement ortho (Left, 12/22/2016); and hardware removal ortho (Left, 12/22/2016).    SOCIAL HISTORY  Social History   Substance Use Topics   • Smoking status: Never Smoker    • Smokeless tobacco: Never Used   • Alcohol Use: No      History   Drug Use No       FAMILY HISTORY  Family History   Problem Relation Age of Onset   • Heart Attack Mother    • Heart Disease Father    • Diabetes Mother    • Diabetes Father        CURRENT MEDICATIONS  Home Medications     Reviewed by Molina Haddad R.N. (Registered Nurse) on 01/05/17 at 2017  Med List Status: Partial    Medication Last Dose Status    acetaminophen (TYLENOL) 500 MG Tab 12/21/2016 Active    acyclovir (ZOVIRAX) 800 MG Tab  Active    amoxicillin-clavulanate (AUGMENTIN) 875-125 MG Tab  Active    Calcium Carbonate-Vitamin D 600-400 MG-UNIT Tab 12/21/2016 Active    emtricitabine-tenofovir (TRUVADA) 200-300 MG per tablet 12/21/2016 Active    Ferrous Sulfate (IRON) 325 (65 FE) MG Tab 12/21/2016 Active    hydrocortisone acetate 0.5 % cream  Active    HYDROmorphone (DILAUDID) 2 MG Tab 12/20/2016 Active    insulin NPH (NOVOLIN N RELION) 100 UNIT/ML Suspension 12/21/2016 Active    insulin regular (HUMULIN R) 100 Unit/mL Solution 12/21/2016 Active    metformin (GLUCOPHAGE) 1000 MG tablet 12/21/2016 Active    morphine (MS IR) 15 MG tablet unk Active    multivitamin (THERAGRAN) Tab 12/21/2016 Active    paroxetine (PAXIL) 20 MG TABS 12/21/2016 Active    raltegravir (ISENTRESS) 400 MG Tab 12/21/2016 Active    sulfamethoxazole-trimethoprim (BACTRIM DS) 800-160  "MG tablet  Active    warfarin (COUMADIN) 5 MG Tab 12/9/2016 Active                ALLERGIES  Allergies   Allergen Reactions   • Oxycodone      Hallucination  Rxn = May 2016       PHYSICAL EXAM  VITAL SIGNS: /71 mmHg  Pulse 90  Temp(Src) 37 °C (98.6 °F)  Resp 18  Ht 1.778 m (5' 10\")  Wt 81.647 kg (180 lb)  BMI 25.83 kg/m2    Constitutional:  No acute distress  HENT: Moist mucous membranes  Eyes: No conjunctivitis or icterus  Neck: trachea is midline, no palpable thyroid  Lymphatic: No cervical lymphadenopathy  Cardiovascular: Regular rate and rhythm, no murmurs  Thorax & Lungs: Normal breath sounds, no rhonchi  Abdomen: Soft, Non-tender. Full bladder.   Skin:. no rash  Back: Non-tender, no CVA tenderness  Extremities:  Bandaged left foot with slight swelling to left leg but no edema.   Vascular: symmetric radial pulse  Neurologic: Normal gross motor    LABS  Labs Reviewed   CBC WITH DIFFERENTIAL - Abnormal; Notable for the following:     RBC 3.35 (*)     Hemoglobin 8.1 (*)     Hematocrit 26.3 (*)     MCV 78.5 (*)     MCH 24.2 (*)     MCHC 30.8 (*)     RDW 50.7 (*)     Platelet Count 584 (*)     Neutrophils-Polys 86.10 (*)     Lymphocytes 3.50 (*)     Lymphs (Absolute) 0.28 (*)     All other components within normal limits   COMP METABOLIC PANEL - Abnormal; Notable for the following:     Sodium 132 (*)     Glucose 164 (*)     Bun 7 (*)     Creatinine 0.41 (*)     Alkaline Phosphatase 178 (*)     Albumin 2.8 (*)     Globulin 3.8 (*)     All other components within normal limits   TROPONIN   BTYPE NATRIURETIC PEPTIDE   LACTIC ACID   URINALYSIS,CULTURE IF INDICATED   ESTIMATED GFR   DIFFERENTIAL MANUAL   PERIPHERAL SMEAR REVIEW   PLATELET ESTIMATE   MORPHOLOGY   URINE MICROSCOPIC (W/UA)     All labs reviewed by me.    EKG Interpretation  EKG Interpretation    Interpreted by me    Rhythm: normal sinus   Rate: normal, 89  Axis: normal  Ectopy: none  Conduction: normal  ST Segments: no acute change  T Waves: no " acute change  Q Waves: none    Clinical Impression: no acute changes and normal EKG    RADIOLOGY  DX-CHEST-PORTABLE (1 VIEW)   Final Result      No acute cardiopulmonary abnormality.      CT-HEAD W/O   Final Result      Normal CT scan of the head without contrast.               INTERPRETING LOCATION:  [address] [place] [address], [id number]        The radiologist's interpretation of all radiological studies have been reviewed by me.    COURSE & MEDICAL DECISION MAKING  Pertinent Labs & Imaging studies reviewed. (See chart for details)    7:49 PM - Patient seen and examined at bedside. Ordered CT head, chest x-ray, CBC, CMP, urinalysis and other labs to evaluate his symptoms. The differential diagnoses include but are not limited to: Sepsis vs cardiac    9:04 PM Recheck: Patient re-evaluated at beside. I informed the patient of his lab and radiology results at bedside and we discussed possible admission. I explained there is no identifiable cause revealed from his diagnostic studies. I told the patient and his family member I will consult with the hospitalist. They were agreeable.     9:09 PM Consulted with hospitalist, Dr. Jackson and discussed patient's condition.       DISPOSITION:  Patient will be admitted to hospitalist, Dr. Jackson in guarded condition.    FINAL IMPRESSION  1. Confusion    2. Weakness          Nidia HINES (Scribe), am scribing for, and in the presence of, Goyo Stewart M.D..    Electronically signed by: Nidia Vargas (Scribe), 1/5/2017    Goyo HINES M.D. personally performed the services described in this documentation, as scribed by Nidia Vargas in my presence, and it is both accurate and complete.    The note accurately reflects work and decisions made by me.  Goyo Stewart  1/5/2017  9:21 PM

## 2017-01-06 NOTE — DISCHARGE PLANNING
Care Transition Team Assessment    Information Source  Who is responsible for making decisions for patient? : Patient  Source of Information: Patient  Primary Caregiver for Others?: No    Readmission Evaluation  Is this a readmission?: Yes - unplanned readmission  Why do you think you were readmitted?:  (fell)  Was an appointment arranged for you prior to discharge?: No  Were there new prescriptions you were supposed to fill after you were discharged?: No  Did you understand your discharge instructions?: Yes  Did you have enough support after your last discharge?: Yes         Interdisciplinary Discharge Planning  Does Admitting Nurse Feel This Could be a Complex Discharge?: No  Primary Care Physician:  (Dr. Leon)  Lives with - Patient's Self Care Capacity: Unrelated Adult  Patient or legal guardian wants to designate a caregiver (see row info): No  Support Systems: Family Member(s), Friends / Neighbors  Housing / Facility: [address]  Do You Take your Prescribed Medications Regularly: Yes  Able to Return to Previous ADL's: Yes  Mobility Issues: Yes (not distance ambulator)  Prior Services: None  Patient Expects to be Discharged to::  (Home)  Assistance Needed: No  Durable Medical Equipment: Walker, Other - Specify (walker, scooter, CPAP (has not been using CPAP))    Discharge Preparedness  [facility] resources needed?: None  What is your plan after discharge?:  (home independent)  Pharmacy:  ([facility] [address])  Prescription Coverage: Yes    Functional Assesment  Prior Functional Level: Independent with Activities of Daily Living    Finances  Medical Insurance Coverage: Medicare, Private insurance    Vision / Hearing Impairment  Vision Impairment : No  Hearing Impairment : No    Values / Beliefs / Concerns  Values / Beliefs Concerns : No    Advance Directive  Advance Directive?: None  Advance Directive offered?: AD Booklet refused    Domestic Abuse  Have you ever been the victim of abuse or violence?:  No    Psychological Assessment  Suspect Abuse: No  Suspect Domestic Violence: No  History of Substance Abuse: None  History of Psychiatric Problems: No  No Needs at this Time: No Needs at This Time    Discharge Risks or Barriers  Discharge risks or barriers?: No    Anticipated Discharge Information  Anticipated discharge disposition: Home

## 2017-01-06 NOTE — PROGRESS NOTES
Pt aao x 4. Answered all questions correct. Refused bed alarm. Using urinal to void. Lunch box provided. poc discussed with pt. Call light within reach. Educate pt to use call light for assistance. hourly rounding in use

## 2017-01-07 ENCOUNTER — PATIENT OUTREACH (OUTPATIENT)
Dept: HEALTH INFORMATION MANAGEMENT | Facility: OTHER | Age: 62
End: 2017-01-07

## 2017-01-07 LAB
ANNOTATION COMMENT IMP: ABNORMAL
BACTERIA UR CULT: NORMAL
CD3 CELLS # BLD: 730 CELLS/UL (ref 570–2400)
CD3+CD4+ CELLS # BLD: 13 CELLS/UL (ref 430–1800)
CD3+CD4+ CELLS/CD3+CD8+ CLL BLD: 0.02 RATIO (ref 0.8–3.9)
CD3+CD8+ CELLS # BLD: 691 CELLS/UL (ref 210–1200)
SIGNIFICANT IND 70042: NORMAL
SITE SITE: NORMAL
SOURCE SOURCE: NORMAL

## 2017-01-07 NOTE — PROGRESS NOTES
LIMB PRESERVATION SERVICE     Informed by wound team that patient was in CDU.  He has been admitted for observation due to weakness and possible confusion. Patient is very well known to this service from treatment of a left foot diabetic ulcer, complicated by Charcot deformity and osteomyelitis.  Most recently, he was admitted on 12/22/16 to [facility] for an infection in this foot and underwent an I&D, removal of hardware, and left ankle arthrotomy with irrigation, by Dr. Edwards.  He was also receiving IV abx, managed by infectious diseases, [facility].  Post-operatively, his surgical wounds have not done well, with partial dehiscence, and heavy drainage.  He has decided to proceed with a BKA, but was hoping to wait until Dr. Edwards is back from vacation after 1/13.  He has an appointment with Dr. Andrei Vergara, physical medicine and rehabilitation, on 1/9 for pre-amputation education and counseling.     Wounds assessed and dressing changed prior to pt's discharge from CDU.  Dorsal/lateral foot incision with 3 open wounds between sutures, open tract running underneath the incision, moderate amount of serosanguinous drainage.  One open wound along medial incision with tract running underneath entire length of the incision, moderate amount of serosanguinous drainage.  Erythema radiating from incisions to anklle, light red, receded from previously outlined area on pt's skin. Wounds irrigated and packed with silver strip packing, covered with ABD pad, secured with Kerlix.    Patient denies having fevers, chills, nausea, or vomiting.   WBC 8.0; Hgb 8.1; Hct 26.3    We discussed having him admitted and proceeding with BKA over the weekend with on call ortho surgeon.  Patient opted to go home, see Dr. Vergara on Monday as scheduled, and then possibly proceed with the BKA next week with Dr. Tijerina, or postponing until Dr. Edwards returns.   He understands that he is to go to ED ASAP if he develops increased redness,  drainage or odor from the wounds, and/or  if he develops fever, chills, nausea or vomiting.    His SO, Froylan, will be working over the weekend.  Pt refuses to stay with close friend, Sameera, or to have her come stay with him.  He does agree to allow Sameera check on him every few hours and to keep his cell phone on him at all times.    He is to see me in the wound clinic tomorrow for a dressing change.

## 2017-01-07 NOTE — PROGRESS NOTES
Pt discharged to home. IV d/c'd, pt armband removed. Pt and family understand written and verbal d/c instructions.  No new rx's.  Regular diet, activity as tolerated.  Pt to follow up with Dr. Tijerina.  Pt verbalized understanding.

## 2017-01-07 NOTE — PROGRESS NOTES
"Discharge Outreach - patient became irritated with my questions - told me to \"ask some decent questions\" and then hung up.   "

## 2017-01-08 NOTE — DISCHARGE SUMMARY
ADMITTING DIAGNOSES:  Confusion, AIDS, left foot osteomyelitis, and baseline   rheumatic pain disorder.    DISCHARGE DIAGNOSES:  Confusion resolved with essentially a benign workup.     Left foot osteomyelitis with close followup; acquired immunodeficiency   syndrome, chronic pain disorder.    CONSULTATIONS:  None.    PROCEDURES:  None.    DISCHARGE DIET:  As tolerated.    DISCHARGE MEDICATIONS:  He is to continue his home medications at their   previous dosages including acyclovir 800 mg twice daily, Tylenol 500 mg 2 tabs   every 6 hours as needed, Augmentin 875/125 twice a day for 2 weeks, Truvada 1   tablet daily, Humulin regular insulin 15-30 units as a sliding scale,   Dilaudid 2 mg every 3 hours as needed for pain, iron sulfate 325 mg 3 times   daily with meals, metformin 1 g twice daily with meals, immediate release   morphine 15 mg every 4 hours as needed for pain, multivitamin 2 tabs daily,   insulin NPH 40 units twice daily, Paxil 20 mg daily, Isentress 400 mg twice   daily, Bactrim DS 1 tab daily, Coumadin 5 mg on Sunday, Tuesday, Thursday,   Saturday and 7.5 mg on Monday, Wednesday, Friday.      Follow up, his existing appointment for wound care with Dr. Tijerina and Dr. Angie Hull on January 11, 2017 at 11:00 am.    HISTORY OF PRESENT ILLNESS AND HOSPITAL COURSE:  This is a 61-year-old   gentleman who came in with confusion.  A CT head was performed.  His labs were   reviewed and essentially benign.  He has an ectopy on telemetry.  He did not   have any further confusion. This was felt possibly via narcotic side effect.    He felt well, had no further confusion and is back to the baseline.  He was   anxious for discharge to home with close followup with one of the surgeons for   the amputation.  With this being the case and his mental status is back to   baseline.   He was able to be discharged in improved and stable condition with   close outpatient followup.    Total time of discharge is 39  minutes.       ____________________________________     MD ESTRADA Mchugh    DD:  01/07/2017 09:58:22  DT:  01/07/2017 17:27:57    D#:  [id number]  Job#:  [id number]

## 2017-01-09 ENCOUNTER — OFFICE VISIT (OUTPATIENT)
Dept: WOUND CARE | Facility: MEDICAL CENTER | Age: 62
End: 2017-01-09
Attending: ORTHOPAEDIC SURGERY
Payer: MEDICARE

## 2017-01-09 DIAGNOSIS — D64.9 ANEMIA, UNSPECIFIED TYPE: ICD-10-CM

## 2017-01-09 DIAGNOSIS — L08.9 INFECTED WOUND: ICD-10-CM

## 2017-01-09 DIAGNOSIS — E11.9 TYPE 2 DIABETES MELLITUS WITHOUT COMPLICATION, UNSPECIFIED LONG TERM INSULIN USE STATUS: ICD-10-CM

## 2017-01-09 DIAGNOSIS — T14.8XXA INFECTED WOUND: ICD-10-CM

## 2017-01-09 DIAGNOSIS — B20 AIDS (HCC): ICD-10-CM

## 2017-01-09 PROCEDURE — A6253 ABSORPT DRG > 48 SQ IN W/O B: HCPCS

## 2017-01-09 PROCEDURE — A6402 STERILE GAUZE <= 16 SQ IN: HCPCS

## 2017-01-09 PROCEDURE — 99213 OFFICE O/P EST LOW 20 MIN: CPT | Performed by: FAMILY MEDICINE

## 2017-01-09 PROCEDURE — 97602 WOUND(S) CARE NON-SELECTIVE: CPT

## 2017-01-09 NOTE — WOUND TEAM
Notified pt's current PCP Kong Jha of low trending H&H. Pt has not yet established with new provider Rosa M Saleh. Detailed message left with assistant Paz and number for MD to return call given to her.

## 2017-01-09 NOTE — PROGRESS NOTES
Pt seen with Nancy Anaya, RN.    Asked to see for question of vac placement.    61 year old gentleman with a hx of HIV+ and IDDM , Charcot deformity and a navicular fx.    Pt underwent ORIF and gastrocnemius recession as well as an ankle fusion.    Patient wound had matristem placed for 9 sessions and closure of wound on 11/16/2016.    Patient recently felt feverish and leg pain on 12/10/16, went to [facility] and discharged 12/13.   Was seen at wound clinic following discharge. S/p Left foot removal of hardware, I & D, ankle arthrotomy   by Dr. Edwards on 12/22/16. Discharged from [facility] on 12/24 referred to [facility] for treatment of the incisions    Asked to see for question of vac placement.    Pt.’s chart reviewed.    History of low H/H since summer 2016.  Pt states he has not had any evaluation for the anemia although   he did receive blood during recent hospitalization.  Was also recently hospitalized for syncopal episode.    Pt. does not have vac with him today.  Has an appt. this afternoon with Dr. Vergara to discuss BKA   as well as an appt on Friday with Dr. Tijerina.  Advised pt. to go to ED ASAP if he develops increased redness, drainage or odor from the wounds,   and/or  if he develops fever, chills, nausea or vomiting.    Nancy notified pt's current PCP Kong Jha of low trending H&H which need to be evaluated.

## 2017-01-09 NOTE — WOUND TEAM
Advanced Wound Care  [facility]  [address]  [address]  [place] [address]  [phone] Fax: [phone]      Encounter note  For Certification Period: 12/26/2016 - 01/26/2017      Referring Physician: Abhishek Edwards MD  Primary Physician: Kong Jha MD  Consulting Physicians:         Wound(s): L foot medial and dorsal foot incisions  Start of Care: 12/26/16       Subjective:        HPI: 61 year old gentleman with a hx of HIV+ and IDDM , Charcot deformity and a navicular fx.  Pt underwent ORIF and gastrocnemius recession as well as an ankle fusion.  S/p I/D dehisced area of his medial foot incision with NPWT wound VAC placed post-operatively on 7/16/16.  Pt DC from [facility] 7/20/16.    Patient wound had matristem placed for 9 sessions and closure of wound on 11/16/2016.  Patient recently felt feverish and leg pain on 12/10/16, went to [facility] and discharged 12/13. Was seen at wound clinic following discharge. S/p Left foot removal of hardware, I & D, ankle arthrotomy by Dr. Edwards on 12/22/16. Discharged from [facility] on 12/24 referred to [facility] for treatment of the incisions.      Pain:  7/10 on 0-10 pain scale to left foot, pt states constant pain and that he forgot to take his own pain meds prior to appt    Past Medical History:  Past Medical History   Diagnosis Date   • HIV (human immunodeficiency virus infection) ([facility])    • Diabetes ([facility])    • Neuropathy ([facility])    • Arthritis    • Renal disorder      5/4/2016,pt states not aware of any renal disorder   • Sleep apnea      diagnosed with sleep apnea,has cpap,lost 60 lbs,uses cpap sometimes   • Back pain      5/24/2016 states back pain not an issue at this time   • Bronchitis    • Depression    • Chickenpox    • Mumps    • Tonsillitis    • Whooping cough        Current Medications:   coumadin restarted, taking augmentin for 14 days            Allergies:   Allergies   Allergen Reactions   • Oxycodone      Hallucination  Rxn = May 2016            Objective:      Tests and Measures:  Left Foot warm  +1 DP pulse, confirmed with doppler, audible DP and PT pulse strongly    Orthotic, protective, supportive devices: offloading boot, Walker, knee scooter    Fall Risk Assessment (edward all that apply with an X): Completed 12/26/2016   65 years or older     xFall within the last 2 years,    xAmbulatory devices, FWW  Loss of protective sensation in feet,   xUse of prostethic/orthotic, years    Presence of lower extremity/foot/toe amputation   xTaking medication that increases risk (per facility policy)    Interventions Recommended (if any of the above are selected):   xUse of Assistive Device:_________fww______________   Supervision with ambulation:  Caregiver   xAssistance with ambulation:  Caregiver   Bashir safety education:  Educational material provided         Wound Characteristics                                                    Location:  Left Foot dorsal incision  Initial Evaluation  Date: 12/26/2016 Encounter date   1/9/17   Tissue Type and %: Incision with sutures with pinpoint opening 100% yellow slough/friable visible, wound tracts too deep to assess depth Incision dehiscing      Periwound: intact Intact with sutures, erythema, edema   Drainage: heavy serosanguinous with tissue flecks with epression Moderate ss   Exposed structures None none   Wound Edges:   Approximated except for opening Opening between sutures    Odor: None none   S&S of Infection:   None Erythema, edema   Edema: +3 pitting 4+   Sensation: intact intact               Measurements:  L dorsal incision Initial Evaluation  Date: 12/26/2016 Encounter note  Date: 1/9/17  Length of incision measured, 3 small openings along incision line   Length (cm) 6.5 5.2   Width (cm) 0.3 0.5   Depth (cm) 0.6 2.2   Area (cm2) 1.95 2.6 cm2   Tract/undermine 0.6 3.8 cm tract @ 10:00       Location:  Left Foot Medial Midfoot incision Initial Evaluation  Date: 12/26/2016 Encounter note  Date: 1/917  "  Tissue Type and %: Incision approximated with sutures except for pinpoint opening  Incision dehiscing, dark red tissue to wound bed that is visible, some yellow slough removed, tissue is extremely boggy/friable     Periwound: Slight maceration Intact with sutures, erythema, edema   Drainage: Heavy serosanguinous with tissue flecks with expression Moderate ss   Exposed structures None none   Wound Edges:   Approximated except for opening Open   Odor: None none   S&S of Infection:   None Edema,erythema   Edema: +3 pitting 4+   Sensation: intact intact               Measurements:  L medial foot incision  Initial Evaluation  Date: 2016 Encounter note  Date: 17   Length (cm) 9.5 1 area of dihisence along suture line   Width (cm) 0.2 0.6   Depth (cm) 0.9 0.8   Area (cm2) 1.9 0.48 cm2   Tract/undermine 0.9 Tract @ 10:00, 7.0 cm         Procedures: no pulse levage today per Dr. Kenney, tissue extremely friable     Debridement :  Non selective with gauze and cotton tip applicator   Cleansed with:   NS                                                                 Periwound protected with: no sting skin prep, barrier paste   Primary dressin/4\" Algidex strip packing to all tracts to both incisions, with tail left out for removal   Secondary Dressing: abd pad, kerlix   Other: tubigrip G     Patient Education: pt discharged from hospital  for altered LOC, pt did not bring VAC this visit. Joint visit with Dr. Kenney, per MD no pulse levage today. MD made aware of hemoglobin, which has been trending down for some time. Pt presents to clinic today, pale and frail in appearance. Pt states that he has been taking iron for a few months. Pt instructed to consult PCP asap regarding H&H. Pt states that he recently changed primary doctors. New MD will be Rosa M Saleh. No answer when clinician called provider today to inform of low H&H. Will continue to call and instructed pt to make appt with new PCP today. Pt to see " Dr. Vergara today to consult for possible foot amputation. POC discussed at length with Dr. Kenney. Pt instructed to bring vac to next appt. And is knowledgeable with changing of dressing at home. Pt's partner Froylan has been changing daily at home between appts. Pt instructed to return to ER for any s/s of infection, fever, n/v, or chills.  Pt is scheduled for ortho rounds this Friday 1/13, as he missed last week due to being in the hospital. Pt verbalized understanding to all instructions.    Professional Collaboration:   joint visit with Dr. Kenney for consult   Assessment:      Wound etiology: surgical     Wound Progress:  Increased depth to wounds tracts, poor tissue quality    Rationale for Treatment: Algidex strip to assist with removal of debris, abd pads to manage exudate    Patient tolerance/compliance: Patient tolerated treatment well.    Complicating factors:  HIV+,  DM2    Need for ongoing Advanced Wound Care services:Patient requires skilled therapeutic wound care services for product selection, application of product, debridement, close monitoring with clinical assessment for expedite of wound healing.       Plan:      Treatment Plan and Recommendations:  Selective, non selective debridement       Frequency: 3x/weekly      Treatment Goals: STG 2 Weeks  LTG 4 Weeks   Granulation Tissue: n/a n/a   Decrease Necrotic Tissue to: n/a n/a   Wound Phase:  n/a  n/a   Decrease Size by: Decrease drainage by 50% Decrease drainage by 100%   Periwound:  intact intact   Decrease tracts/undermining by: 10% 20%   Decrease Pain:  4/10 0/10       At the time of each visit a thorough assessment of the patient is completed to assure the  appropriateness of our plan of care.  The dressings or modalities may need to be adapted   from the original plan to address any significant changes in the wound environment.          Clinician Signature:_______________________________Date__________________      Physician  Signature:______________________________Date:__________________

## 2017-01-11 ENCOUNTER — NON-PROVIDER VISIT (OUTPATIENT)
Dept: WOUND CARE | Facility: MEDICAL CENTER | Age: 62
End: 2017-01-11
Attending: ORTHOPAEDIC SURGERY
Payer: MEDICARE

## 2017-01-11 DIAGNOSIS — L08.9 INFECTED WOUND: ICD-10-CM

## 2017-01-11 DIAGNOSIS — T14.8XXA INFECTED WOUND: ICD-10-CM

## 2017-01-11 DIAGNOSIS — M86.172 OSTEOMYELITIS OF FOOT, LEFT, ACUTE (HCC): ICD-10-CM

## 2017-01-11 DIAGNOSIS — E11.21 TYPE 2 DIABETES MELLITUS WITH DIABETIC NEPHROPATHY, UNSPECIFIED LONG TERM INSULIN USE STATUS: ICD-10-CM

## 2017-01-11 DIAGNOSIS — B20 AIDS (HCC): ICD-10-CM

## 2017-01-11 PROCEDURE — 97602 WOUND(S) CARE NON-SELECTIVE: CPT

## 2017-01-11 PROCEDURE — 99213 OFFICE O/P EST LOW 20 MIN: CPT | Performed by: FAMILY MEDICINE

## 2017-01-11 PROCEDURE — A6253 ABSORPT DRG > 48 SQ IN W/O B: HCPCS

## 2017-01-11 PROCEDURE — A6402 STERILE GAUZE <= 16 SQ IN: HCPCS

## 2017-01-11 PROCEDURE — 302985 HCHG BULKY ROLL STERILE

## 2017-01-11 NOTE — WOUND TEAM
Advanced Wound Care  [facility]  [address]  [address]  [place] [address]  [phone] Fax: [phone]      Encounter note  For Certification Period: 12/26/2016 - 01/26/2017      Referring Physician: Abhishek Edwards MD  Primary Physician: Kong Jha MD  Consulting Physicians:         Wound(s): L foot medial and dorsal foot incisions    Start of Care: 12/26/16       Subjective:        HPI: 61 year old gentleman with a hx of HIV+ and IDDM , Charcot deformity and a navicular fx.  Pt underwent ORIF and gastrocnemius recession as well as an ankle fusion.  S/p I/D dehisced area of his medial foot incision with NPWT wound VAC placed post-operatively on 7/16/16.  Pt DC from [facility] 7/20/16.    Patient wound had matristem placed for 9 sessions and closure of wound on 11/16/2016.  Patient recently felt feverish and leg pain on 12/10/16, went to [facility] and discharged 12/13. Was seen at wound clinic following discharge. S/p Left foot removal of hardware, I & D, ankle arthrotomy by Dr. Edwards on 12/22/16. Discharged from [facility] on 12/24 referred to [facility] for treatment of the incisions.      Pain: 8/10 on 0-10 pain scale to left foot, pt states constant pain    Current Medications:   coumadin restarted, taking augmentin for 14 days    Allergies:   Allergies   Allergen Reactions   • Oxycodone      Hallucination  Rxn = May 2016        Objective:      Tests and Measures:  Left Foot warm  +1 DP pulse, confirmed with doppler, audible DP and PT pulse strongly    Orthotic, protective, supportive devices: offloading boot, Walker, knee scooter    Fall Risk Assessment (edward all that apply with an X): Completed 12/26/2016   65 years or older     xFall within the last 2 years,    xAmbulatory devices, FWW  Loss of protective sensation in feet,   xUse of prostethic/orthotic, years    Presence of lower extremity/foot/toe amputation   xTaking medication that increases risk (per facility policy)    Interventions  Recommended (if any of the above are selected):   xUse of Assistive Device:_________fww______________   Supervision with ambulation:  Caregiver   xAssistance with ambulation:  Caregiver   Bashir safety education:  Educational material provided         Wound Characteristics                                                    Location:  Left Foot dorsal incision  Initial Evaluation  Date: 12/26/2016 Encounter date   1/9/17 Encounter Date:  1/11/17   Tissue Type and %: Incision with sutures with pinpoint opening 100% yellow slough/friable visible, wound tracts too deep to assess depth Incision dehiscing    100% yellow slough/friable visible, wound tracts too deep to assess depth, Incision dehiscing    Periwound: intact Intact with sutures, erythema, edema Intact with sutures, erythema, edema   Drainage: heavy serosanguinous with tissue flecks with epression Moderate ss Heavy ss and flecks of tissue   Exposed structures None none None   Wound Edges:   Approximated except for opening Opening between sutures  Opening between sutures   Odor: None None None   S&S of Infection:   None Erythema, edema Erythema, edema, poor tissue quality   Edema: +3 pitting 4+ 3+ non pitting   Sensation: intact intact Intact               Measurements:  L dorsal incision Initial Evaluation  Date: 12/26/2016 Encounter note  Date: 1/9/17  Length of incision measured, 3 small openings along incision line   Length (cm) 6.5 5.2   Width (cm) 0.3 0.5   Depth (cm) 0.6 2.2   Area (cm2) 1.95 2.6 cm2   Tract/undermine 0.6 3.8 cm tract @ 10:00       Location:  Left Foot Medial Midfoot incision Initial Evaluation  Date: 12/26/2016 Encounter note  Date: 1/917 Encounter Date:  1/11/17   Tissue Type and %: Incision approximated with sutures except for pinpoint opening  Incision dehiscing, dark red tissue to wound bed that is visible, some yellow slough removed, tissue is extremely boggy/friable   Incision dehiscing, dark red tissue to wound bed that is  "visible, some yellow slough removed, tissue is extremely boggy/friable     Periwound: Slight maceration Intact with sutures, erythema, edema Intact with sutures, erythema, edema   Drainage: Heavy serosanguinous with tissue flecks with expression Moderate ss Moderate ss   Exposed structures None None None   Wound Edges:   Approximated except for opening Open Open   Odor: None None None   S&S of Infection:   None Edema,erythema Edema, erythema, tissue quality   Edema: +3 pitting 4+ 4+   Sensation: intact intact Intact               Measurements:  L medial foot incision  Initial Evaluation  Date: 2016 Encounter note  Date: 17   Length (cm) 9.5 1 area of dihisence along suture line   Width (cm) 0.2 0.6   Depth (cm) 0.9 0.8   Area (cm2) 1.9 0.48 cm2   Tract/undermine 0.9 Tract @ 10:00, 7.0 cm         Procedures: No pulse lavage today due to tissue is very friable      Debridement :  Non selective with NS, gauze and cotton tipped applicator to remove loose tissue from wound   Cleansed with:   NS                                                                 Periwound protected with: no sting skin prep, barrier paste   Primary dressin/4\" Algidex strip packing to all tracts to both incisions, with tail left out for removal   Secondary Dressing: abd pad, kerlix   Other: Tubigrip G     Patient Education: Reinforced instruction of ss infection - erythema, edema, localized heat, fever/chills/N+V, when to call MD/go to ER. Spoke at length with patient about following up with PCP as his hemoglobin was 8.1 on 17 and has been trending low recently. Patient does look better today, more color in his cheeks and less frail looking today. Will attempt a call to Dr. Saleh's office to schedule him an appointment as patient has had no luck getting hold of the office and neither has [facility]. Dr. Kenney was asked to come see patient to follow up from last week. Had long discussion with patient and S.O. About surgery. Patient " scheduled for ortho rounds on 1/13/17 with Dr. Edwards and Dr. Tijerina. Patient verbalizes understanding.     Professional Collaboration:  Dr. Kenney stopped in appointment as patient wanted to follow up with her.   Assessment:      Wound etiology: surgical     Wound Progress:  Increased depth to wounds tracts, poor tissue quality    Rationale for Treatment: Algidex strip to assist with removal of debris, abd pads to manage exudate    Patient tolerance/compliance: Patient tolerated treatment well.    Complicating factors:  HIV+,  DM2    Need for ongoing Advanced Wound Care services:Patient requires skilled therapeutic wound care services for product selection, application of product, debridement, close monitoring with clinical assessment for expedite of wound healing.       Plan:      Treatment Plan and Recommendations:  Selective, non selective debridement       Frequency: 3x/weekly      Treatment Goals: STG 2 Weeks  LTG 4 Weeks   Granulation Tissue: n/a n/a   Decrease Necrotic Tissue to: n/a n/a   Wound Phase:  n/a  n/a   Decrease Size by: Decrease drainage by 50% Decrease drainage by 100%   Periwound:  intact intact   Decrease tracts/undermining by: 10% 20%   Decrease Pain:  4/10 0/10       At the time of each visit a thorough assessment of the patient is completed to assure the  appropriateness of our plan of care.  The dressings or modalities may need to be adapted   from the original plan to address any significant changes in the wound environment.          Clinician Signature:_______________________________Date__________________      Physician Signature:______________________________Date:__________________

## 2017-01-12 ENCOUNTER — APPOINTMENT (OUTPATIENT)
Dept: SLEEP MEDICINE | Facility: MEDICAL CENTER | Age: 62
End: 2017-01-12

## 2017-01-13 ENCOUNTER — APPOINTMENT (OUTPATIENT)
Dept: WOUND CARE | Facility: MEDICAL CENTER | Age: 62
End: 2017-01-13
Attending: ORTHOPAEDIC SURGERY
Payer: MEDICARE

## 2017-01-13 PROCEDURE — A6402 STERILE GAUZE <= 16 SQ IN: HCPCS

## 2017-01-13 PROCEDURE — A6253 ABSORPT DRG > 48 SQ IN W/O B: HCPCS

## 2017-01-13 PROCEDURE — 302985 HCHG BULKY ROLL STERILE

## 2017-01-13 PROCEDURE — 97602 WOUND(S) CARE NON-SELECTIVE: CPT

## 2017-01-13 NOTE — PROGRESS NOTES
Pt seen with Briana Whitaker RN.    Pt. Requesting discussion with MD regarding possible amputation.    61 year old gentleman with a hx of HIV+ and IDDM , Charcot deformity and a navicular fx.    Pt underwent ORIF and gastrocnemius recession as well as an ankle fusion.    Patient wound had matristem placed for 9 sessions and closure of wound on 11/16/2016.    Patient recently felt feverish and leg pain on 12/10/16, went to [facility] and discharged 12/13.   Was seen at wound clinic following discharge. S/p Left foot removal of hardware, I & D, ankle arthrotomy   by Dr. Edwards on 12/22/16. Discharged from [facility] on 12/24 referred to [facility] for treatment of the incisions    Pt.’s chart reviewed.    Once again discussed with pt (his partner was present today) POC.  At this time there is no viable way that I am aware of to avoid amputation.  His foot has deteriorated so much and there is so much osteomyelitis present that the foot is just not viable.  He is currently at risk of developing sepsis the longer he waits for amputation.  He met with Dr. Vergara yesterday, will see Dr. Tijerina during LPS rounds on Friday and is scheduled to see Dr. Edwards on Monday.  He feels that after discussing it with Dr. Edwards he will be ready to make a decision.    Advised pt. to go to ED ASAP if he develops increased redness, drainage or odor from the wounds,   and/or  if he develops fever, chills, nausea or vomiting.

## 2017-01-13 NOTE — PROGRESS NOTES
LIMB PRESERVATION SERVICE ROUNDS    Patient seen in collaboration with interdisciplinary team during LPS rounds in wound clinic for L foot  S/p I & D, ankle arthrotomy by Dr. Edwards on 12/22/16    Pt has met with Dr. Vergara.   Pt would like to try other therapies and surgeries before BKA    Continues to take augmentin and bactrim. Followed by Dr. Boyce  Incisions: medial and dorsal incisions continue with tracts. Non viable tissue in drainage has decreased. periwound with dry pale pink skin surrounding ankle.  Wound care completed by Briana CARBALLO    Options discussed regarding ankle pain. Pt is not a candidate for ankle fusion.     Plan  Pt to be scheduled for I & D with possible veraflo VAC placement with Dr. Edwards  If pt becomes septic, plan for BKA

## 2017-01-13 NOTE — WOUND TEAM
Advanced Wound Care  [facility]  [address]  [address]  [place] [address]  [phone] Fax: [phone]      Encounter note  For Certification Period: 12/26/2016 - 01/26/2017      Referring Physician: Abhishek Edwards MD  Primary Physician: Kong Jha MD  Consulting Physicians:         Wound(s): L foot medial and dorsal foot incisions    Start of Care: 12/26/16       Subjective:        HPI: 61 year old gentleman with a hx of HIV+ and IDDM , Charcot deformity and a navicular fx.  Pt underwent ORIF and gastrocnemius recession as well as an ankle fusion.  S/p I/D dehisced area of his medial foot incision with NPWT wound VAC placed post-operatively on 7/16/16.  Pt DC from [facility] 7/20/16.    Patient wound had matristem placed for 9 sessions and closure of wound on 11/16/2016.  Patient recently felt feverish and leg pain on 12/10/16, went to [facility] and discharged 12/13. Was seen at wound clinic following discharge. S/p Left foot removal of hardware, I & D, ankle arthrotomy by Dr. Edwards on 12/22/16. Discharged from [facility] on 12/24 referred to [facility] for treatment of the incisions.      Pain: 8/10 on 0-10 pain scale to left foot, pt states constant pain    Current Medications:   coumadin restarted, taking augmentin for 14 days    Allergies:   Allergies   Allergen Reactions   • Oxycodone      Hallucination  Rxn = May 2016        Objective:      Tests and Measures:  Left Foot warm  +1 DP pulse, confirmed with doppler, audible DP and PT pulse strongly    Orthotic, protective, supportive devices: offloading boot, Walker, knee scooter    Fall Risk Assessment (edward all that apply with an X): Completed 12/26/2016   65 years or older     xFall within the last 2 years,    xAmbulatory devices, FWW  Loss of protective sensation in feet,   xUse of prostethic/orthotic, years    Presence of lower extremity/foot/toe amputation   xTaking medication that increases risk (per facility policy)    Interventions  Recommended (if any of the above are selected):   xUse of Assistive Device:_________fww______________   Supervision with ambulation:  Caregiver   xAssistance with ambulation:  Caregiver   Bashir safety education:  Educational material provided         Wound Characteristics                                                    Location:  Left Foot dorsal incision  Initial Evaluation  Date: 12/26/2016 Encounter date   1/9/17 Encounter Date:  1/13/17   Tissue Type and %: Incision with sutures with pinpoint opening 100% yellow slough/friable visible, wound tracts too deep to assess depth Incision dehiscing    100% yellow/white slough/friable visible, wound tracts too deep to assess depth, Incision dehiscing    Periwound: intact Intact with sutures, erythema, edema Intact with sutures, erythema, edema   Drainage: heavy serosanguinous with tissue flecks with epression Moderate ss Heavy ss and flecks of tissue   Exposed structures None none None   Wound Edges:   Approximated except for opening Opening between sutures  Opening between sutures   Odor: None None None   S&S of Infection:   None Erythema, edema Erythema, edema, poor tissue quality   Edema: +3 pitting 4+ 3+ non pitting   Sensation: intact intact Intact               Measurements:  L dorsal incision Initial Evaluation  Date: 12/26/2016 Encounter note  Date: 1/9/17  Length of incision measured, 3 small openings along incision line   Length (cm) 6.5 5.2   Width (cm) 0.3 0.5   Depth (cm) 0.6 2.2   Area (cm2) 1.95 2.6 cm2   Tract/undermine 0.6 3.8 cm tract @ 10:00       Location:  Left Foot Medial Midfoot incision Initial Evaluation  Date: 12/26/2016 Encounter note  Date: 1/917 Encounter Date:  1/11/17   Tissue Type and %: Incision approximated with sutures except for pinpoint opening  Incision dehiscing, dark red tissue to wound bed that is visible, some yellow slough removed, tissue is extremely boggy/friable   Incision dehiscing, dark red tissue to wound bed that is  "visible, moderate amount of yellow/white slough removed, tissue is extremely boggy/friable     Periwound: Slight maceration Intact with sutures, erythema, edema Intact with sutures, erythema, edema   Drainage: Heavy serosanguinous with tissue flecks with expression Moderate ss Moderate ss   Exposed structures None None None   Wound Edges:   Approximated except for opening Open Open   Odor: None None None   S&S of Infection:   None Edema,erythema Edema, erythema, tissue quality   Edema: +3 pitting 4+ 4+   Sensation: intact intact Intact               Measurements:  L medial foot incision  Initial Evaluation  Date: 2016 Encounter note  Date: 17   Length (cm) 9.5 1 area of dihisence along suture line   Width (cm) 0.2 0.6   Depth (cm) 0.9 0.8   Area (cm2) 1.9 0.48 cm2   Tract/undermine 0.9 Tract @ 10:00, 7.0 cm         Procedures: No pulse lavage today due to tissue is very friable      Debridement :  Non selective with NS, gauze and cotton tipped applicator to remove loose tissue/slough from wound   Cleansed with:   NS                                                                 Periwound protected with: No sting skin prep, barrier paste   Primary dressin/4\" Algidex strip packing to all tracts to both incisions, with tail left out for removal   Secondary Dressing: abd pad, kerlix   Other: Tubigrip G     Patient Education: Reinforced instruction of ss infection - erythema, edema, localized heat, fever/chills/N+V, when to call MD/go to ER. Discussed POC, wound progression, possible I&D that was discussed in ortho rounds (per patient). Gave a few supplies to hold patient over until next visit as they have recently run out. Patient and s.o. With good understanding of POC.     Professional Collaboration:  Dr. Tijerina for ortho rounds      Assessment:      Wound etiology: surgical     Wound Progress:  Increased depth to wounds tracts, poor tissue quality    Rationale for Treatment: Algidex strip to assist " with removal of debris, abd pads to manage exudate    Patient tolerance/compliance: Patient tolerated treatment well.    Complicating factors:  HIV+,  DM2    Need for ongoing Advanced Wound Care services:Patient requires skilled therapeutic wound care services for product selection, application of product, debridement, close monitoring with clinical assessment for expedite of wound healing.       Plan:      Treatment Plan and Recommendations:  Selective, non selective debridement       Frequency: 3x/weekly      Treatment Goals: STG 2 Weeks  LTG 4 Weeks   Granulation Tissue: n/a n/a   Decrease Necrotic Tissue to: n/a n/a   Wound Phase:  n/a  n/a   Decrease Size by: Decrease drainage by 50% Decrease drainage by 100%   Periwound:  intact intact   Decrease tracts/undermining by: 10% 20%   Decrease Pain:  4/10 0/10       At the time of each visit a thorough assessment of the patient is completed to assure the  appropriateness of our plan of care.  The dressings or modalities may need to be adapted   from the original plan to address any significant changes in the wound environment.          Clinician Signature:_______________________________Date__________________      Physician Signature:______________________________Date:__________________

## 2017-01-16 ENCOUNTER — APPOINTMENT (OUTPATIENT)
Dept: WOUND CARE | Facility: MEDICAL CENTER | Age: 62
End: 2017-01-16
Attending: ORTHOPAEDIC SURGERY
Payer: MEDICARE

## 2017-01-16 ENCOUNTER — HOSPITAL ENCOUNTER (OUTPATIENT)
Facility: MEDICAL CENTER | Age: 62
End: 2017-01-16
Attending: ORTHOPAEDIC SURGERY | Admitting: ORTHOPAEDIC SURGERY
Payer: MEDICARE

## 2017-01-18 ENCOUNTER — OFFICE VISIT (OUTPATIENT)
Dept: WOUND CARE | Facility: MEDICAL CENTER | Age: 62
End: 2017-01-18
Attending: ORTHOPAEDIC SURGERY
Payer: MEDICARE

## 2017-01-18 PROCEDURE — 303972 HCHG HYPAFIX RET DRST 18SQ PC"

## 2017-01-18 PROCEDURE — A6213 FOAM DRG >16<=48 SQ IN W/BDR: HCPCS

## 2017-01-18 PROCEDURE — A6457 TUBULAR DRESSING: HCPCS

## 2017-01-18 PROCEDURE — 97602 WOUND(S) CARE NON-SELECTIVE: CPT

## 2017-01-18 PROCEDURE — A6402 STERILE GAUZE <= 16 SQ IN: HCPCS

## 2017-01-18 NOTE — WOUND TEAM
Advanced Wound Care  [facility]  [address]  [address]  [place] [address]  [phone] Fax: [phone]      Encounter note  For Certification Period: 12/26/2016 - 01/26/2017      Referring Physician: Abhishek Edwards MD  Primary Physician: Kong Jha MD  Consulting Physicians:         Wound(s): L foot medial and dorsal foot incisions    Start of Care: 12/26/16       Subjective:        HPI: 61 year old gentleman with a hx of HIV+ and IDDM , Charcot deformity and a navicular fx.  Pt underwent ORIF and gastrocnemius recession as well as an ankle fusion.  S/p I/D dehisced area of his medial foot incision with NPWT wound VAC placed post-operatively on 7/16/16.  [facility] from [facility] 7[facility]/16.    Patient wound had matristem placed for 9 sessions and closure of wound on 11/16/2016.  Patient recently felt feverish and leg pain on 12/10/16, went to [facility] and discharged 12/13. Was seen at wound clinic following discharge. S/p Left foot removal of hardware, I & D, ankle arthrotomy by Dr. Edwards on 12/22/16. Discharged from [facility] on 12/24 referred to [facility] for treatment of the incisions.      Pain: denies today    Current Medications:   coumadin restarted, taking augmentin for 14 days    Allergies:   Allergies   Allergen Reactions   • Oxycodone      Hallucination  Rxn = May 2016        Objective:      Tests and Measures:  Left Foot warm  +1 DP pulse, confirmed with doppler, audible DP and PT pulse strongly    Orthotic, protective, supportive devices: offloading boot, Walker, knee scooter    Fall Risk Assessment (edward all that apply with an X): Completed 12/26/2016   65 years or older     xFall within the last 2 years,    xAmbulatory devices, FWW  Loss of protective sensation in feet,   xUse of prostethic/orthotic, years    Presence of lower extremity/foot/toe amputation   xTaking medication that increases risk (per facility policy)    Interventions Recommended (if any of the above are  selected):   xUse of Assistive Device:_________fww______________   Supervision with ambulation:  Caregiver   xAssistance with ambulation:  Caregiver   Bashir safety education:  Educational material provided         Wound Characteristics                                                    Location:  Left Foot dorsal incision  Initial Evaluation  Date: 12/26/2016 Encounter date   1/9/17 Encounter note  Date:  01/18/17   Tissue Type and %: Incision with sutures with pinpoint opening 100% yellow slough/friable visible, wound tracts too deep to assess depth Incision dehiscing    90% yellow/white slough/friable visible, 10% pale pink, wound tracts too deep to assess depth, Incision dehiscing    Periwound: intact Intact with sutures, erythema, edema Intact with sutures, erythema, edema   Drainage: heavy serosanguinous with tissue flecks with epression Moderate ss Heavy ss and flecks of tissue, small clots   Exposed structures None none None   Wound Edges:   Approximated except for opening Opening between sutures  Opening between sutures   Odor: None None None   S&S of Infection:   None Erythema, edema Erythema, edema, poor tissue quality   Edema: +3 pitting 4+ 4+ non pitting   Sensation: intact intact Intact               Measurements:  L dorsal incision Initial Evaluation  Date: 12/26/2016 Encounter note  Date: 1/18/17  Length of incision measured, 2 small openings along incision line   Length (cm) 6.5 2.5   Width (cm) 0.3 0.6   Depth (cm) 0.6 0.5- straight down   Area (cm2) 1.95 1.5 cm2   Tract/undermine 0.6 4.0 cm tract @ 10:00       Location:  Left Foot Medial Midfoot incision Initial Evaluation  Date: 12/26/2016 Encounter note  Date: 1/917 Encounter note  Date: 1/18/17   Tissue Type and %: Incision approximated with sutures except for pinpoint opening  Incision dehiscing, dark red tissue to wound bed that is visible, some yellow slough removed, tissue is extremely boggy/friable   100% dark red tissue to wound bed that  "is visible,  Incision dehiscing moderate amount of yellow/white slough removed, small clots tissue is extremely boggy/friable     Periwound: Slight maceration Intact with sutures, erythema, edema Intact with sutures, erythema, edema   Drainage: Heavy serosanguinous with tissue flecks with expression Moderate ss heavy ss   Exposed structures None None None   Wound Edges:   Approximated except for opening Open Open   Odor: None None None   S&S of Infection:   None Edema,erythema Edema, erythema, tissue quality   Edema: +3 pitting 4+ 4+   Sensation: intact intact Intact               Measurements:  L medial foot incision  Initial Evaluation  Date: 2016 Encounter note  Date: 17   Length (cm) 9.5 1 area of dihisence along suture line  1.2   Width (cm) 0.2 2.5   Depth (cm) 0.9 2.0   Area (cm2) 1.9 3.0 cm2   Tract/undermine 0.9 Tract @ 4:00- 3.0 cm  @ 2:00 - 2.8 cm         Procedures:      Debridement :  Non selective with NS, gauze and cotton tipped applicator to remove loose tissue/slough from wound   Cleansed with:   NS and no rinse foam cleanser to foot                                                                 Periwound protected with: No sting skin prep, barrier paste   Primary dressin/4\" Algidex strip packing to all tracts to both incisions, with tail left out for removal   Secondary Dressing: superabsorbent non adhesive foam, abd pad, kerlix   Other: Tubigrip G     Patient Education: Reinforced instruction of ss infection - erythema, edema, localized heat, fever/chills/N+V, when to call MD/go to ER. Discussed POC, wound progression.  Pt states that he is scheduled for surgery at 0500 tomorrow  at [place] with Dr. Edwards. Per pt he will have and I &D with vac to stay in place distributing abx into wound and will likely be admitted for one week. Pt and S.O. Knowledgeable with dressing care if wound becomes saturated and needs to be changed today. Pt and S.O Fro[place] verbalized understanding " to all.    Professional Collaboration:  None today      Assessment:      Wound etiology: surgical     Wound Progress:  Wound continues with very poor tissue quality and friable boggy tissue    Rationale for Treatment: Algidex strip to assist with removal of debris, antimicrobial, abd pads to manage exudate    Patient tolerance/compliance: Patient tolerated treatment well.    Complicating factors:  HIV+,  DM2    Need for ongoing Advanced Wound Care services:Patient requires skilled therapeutic wound care services for product selection, application of product, debridement, close monitoring with clinical assessment for expedite of wound healing.       Plan:      Treatment Plan and Recommendations:  Selective, non selective debridement       Frequency: 3x/weekly      Treatment Goals: STG 2 Weeks  LTG 4 Weeks   Granulation Tissue: n/a n/a   Decrease Necrotic Tissue to: n/a n/a   Wound Phase:  n/a  n/a   Decrease Size by: Decrease drainage by 50% Decrease drainage by 100%   Periwound:  intact intact   Decrease tracts/undermining by: 10% 20%   Decrease Pain:  4/10 0/10       At the time of each visit a thorough assessment of the patient is completed to assure the  appropriateness of our plan of care.  The dressings or modalities may need to be adapted   from the original plan to address any significant changes in the wound environment.          Clinician Signature:_______________________________Date__________________      Physician Signature:______________________________Date:__________________

## 2017-01-19 ENCOUNTER — APPOINTMENT (OUTPATIENT)
Dept: RADIOLOGY | Facility: MEDICAL CENTER | Age: 62
End: 2017-01-19
Payer: MEDICARE

## 2017-01-20 ENCOUNTER — APPOINTMENT (OUTPATIENT)
Dept: WOUND CARE | Facility: MEDICAL CENTER | Age: 62
End: 2017-01-20
Attending: ORTHOPAEDIC SURGERY
Payer: MEDICARE

## 2017-01-23 ENCOUNTER — APPOINTMENT (OUTPATIENT)
Dept: WOUND CARE | Facility: MEDICAL CENTER | Age: 62
End: 2017-01-23
Attending: ORTHOPAEDIC SURGERY
Payer: MEDICARE

## 2017-01-25 ENCOUNTER — APPOINTMENT (OUTPATIENT)
Dept: WOUND CARE | Facility: MEDICAL CENTER | Age: 62
End: 2017-01-25
Attending: ORTHOPAEDIC SURGERY
Payer: MEDICARE

## 2017-01-27 ENCOUNTER — NON-PROVIDER VISIT (OUTPATIENT)
Dept: WOUND CARE | Facility: MEDICAL CENTER | Age: 62
End: 2017-01-27
Attending: ORTHOPAEDIC SURGERY
Payer: MEDICARE

## 2017-01-27 NOTE — CERTIFICATION
Advanced Wound Care   [facility]   [address]   [address]   [place] [address]   [phone] Fax: [phone]    Discharge Note      Referring Physician: Abhishek Edwards MD  Wound Etiology: Surgical  Wound location: L foot medial and dorsal foot incisions  ICD-9:  Date of Discharge: 1/27/17    Assessment:  Discharge patient at this time secondary to admitted to hospital. Please refer patient back to [facility] with any needed post-surgical wound care.     Thank you for the referral and the opportunity to treat your patient.      Clinician Signature: _____________________________ Date:_______________

## 2017-01-30 ENCOUNTER — APPOINTMENT (OUTPATIENT)
Dept: WOUND CARE | Facility: MEDICAL CENTER | Age: 62
End: 2017-01-30
Attending: ORTHOPAEDIC SURGERY
Payer: MEDICARE

## 2017-02-03 ENCOUNTER — HOSPITAL ENCOUNTER (INPATIENT)
Facility: REHABILITATION | Age: 62
End: 2017-02-03
Attending: PHYSICAL MEDICINE & REHABILITATION | Admitting: PHYSICAL MEDICINE & REHABILITATION
Payer: MEDICARE

## 2017-02-27 LAB — EKG IMPRESSION: NORMAL

## 2017-03-02 RX ORDER — HUMAN INSULIN 100 [IU]/ML
INJECTION, SOLUTION SUBCUTANEOUS
Qty: 20 ML | Refills: 2 | Status: SHIPPED | OUTPATIENT
Start: 2017-03-02 | End: 2023-01-16

## 2017-04-07 ENCOUNTER — HOSPITAL ENCOUNTER (OUTPATIENT)
Dept: LAB | Facility: MEDICAL CENTER | Age: 62
End: 2017-04-07
Attending: INTERNAL MEDICINE
Payer: MEDICARE

## 2017-04-07 LAB
ALBUMIN SERPL BCP-MCNC: 4.1 G/DL (ref 3.2–4.9)
ALBUMIN/GLOB SERPL: 1.2 G/DL
ALP SERPL-CCNC: 133 U/L (ref 30–99)
ALT SERPL-CCNC: 41 U/L (ref 2–50)
ANION GAP SERPL CALC-SCNC: 7 MMOL/L (ref 0–11.9)
APPEARANCE UR: CLEAR
AST SERPL-CCNC: 33 U/L (ref 12–45)
BASOPHILS # BLD AUTO: 0.7 % (ref 0–1.8)
BASOPHILS # BLD: 0.04 K/UL (ref 0–0.12)
BILIRUB SERPL-MCNC: 0.3 MG/DL (ref 0.1–1.5)
BILIRUB UR QL STRIP.AUTO: NEGATIVE
BUN SERPL-MCNC: 18 MG/DL (ref 8–22)
CALCIUM SERPL-MCNC: 9.4 MG/DL (ref 8.5–10.5)
CHLORIDE SERPL-SCNC: 104 MMOL/L (ref 96–112)
CHOLEST SERPL-MCNC: 220 MG/DL (ref 100–199)
CO2 SERPL-SCNC: 27 MMOL/L (ref 20–33)
COLOR UR: ABNORMAL
CREAT SERPL-MCNC: 0.57 MG/DL (ref 0.5–1.4)
CREAT UR-MCNC: 49 MG/DL
EOSINOPHIL # BLD AUTO: 0.34 K/UL (ref 0–0.51)
EOSINOPHIL NFR BLD: 6.2 % (ref 0–6.9)
ERYTHROCYTE [DISTWIDTH] IN BLOOD BY AUTOMATED COUNT: 61.4 FL (ref 35.9–50)
EST. AVERAGE GLUCOSE BLD GHB EST-MCNC: 148 MG/DL
FERRITIN SERPL-MCNC: 810.1 NG/ML (ref 22–322)
FOLATE SERPL-MCNC: 19.8 NG/ML
GFR SERPL CREATININE-BSD FRML MDRD: >60 ML/MIN/1.73 M 2
GLOBULIN SER CALC-MCNC: 3.3 G/DL (ref 1.9–3.5)
GLUCOSE SERPL-MCNC: 148 MG/DL (ref 65–99)
GLUCOSE UR STRIP.AUTO-MCNC: 70 MG/DL
HBA1C MFR BLD: 6.8 % (ref 0–5.6)
HCT VFR BLD AUTO: 39.9 % (ref 42–52)
HDLC SERPL-MCNC: 58 MG/DL
HGB BLD-MCNC: 12.8 G/DL (ref 14–18)
HGB RETIC QN AUTO: 34.3 PG/CELL (ref 29–35)
IMM GRANULOCYTES # BLD AUTO: 0.08 K/UL (ref 0–0.11)
IMM GRANULOCYTES NFR BLD AUTO: 1.5 % (ref 0–0.9)
IMM RETICS NFR: 11.9 % (ref 9.3–17.4)
IRON SATN MFR SERPL: 33 % (ref 15–55)
IRON SERPL-MCNC: 119 UG/DL (ref 50–180)
KETONES UR STRIP.AUTO-MCNC: NEGATIVE MG/DL
LDLC SERPL CALC-MCNC: 130 MG/DL
LEUKOCYTE ESTERASE UR QL STRIP.AUTO: NEGATIVE
LYMPHOCYTES # BLD AUTO: 1.13 K/UL (ref 1–4.8)
LYMPHOCYTES NFR BLD: 20.7 % (ref 22–41)
MCH RBC QN AUTO: 28.5 PG (ref 27–33)
MCHC RBC AUTO-ENTMCNC: 32.1 G/DL (ref 33.7–35.3)
MCV RBC AUTO: 88.9 FL (ref 81.4–97.8)
MICRO URNS: ABNORMAL
MICROALBUMIN UR-MCNC: 23.8 MG/DL
MICROALBUMIN/CREAT UR: 486 MG/G (ref 0–30)
MONOCYTES # BLD AUTO: 0.58 K/UL (ref 0–0.85)
MONOCYTES NFR BLD AUTO: 10.6 % (ref 0–13.4)
NEUTROPHILS # BLD AUTO: 3.28 K/UL (ref 1.82–7.42)
NEUTROPHILS NFR BLD: 60.3 % (ref 44–72)
NITRITE UR QL STRIP.AUTO: NEGATIVE
NRBC # BLD AUTO: 0 K/UL
NRBC BLD AUTO-RTO: 0 /100 WBC
PH UR STRIP.AUTO: 6 [PH]
PLATELET # BLD AUTO: 349 K/UL (ref 164–446)
PMV BLD AUTO: 9.3 FL (ref 9–12.9)
POTASSIUM SERPL-SCNC: 4.6 MMOL/L (ref 3.6–5.5)
PROT SERPL-MCNC: 7.4 G/DL (ref 6–8.2)
PROT UR QL STRIP: 30 MG/DL
RBC # BLD AUTO: 4.49 M/UL (ref 4.7–6.1)
RBC # URNS HPF: ABNORMAL /HPF
RBC UR QL AUTO: NEGATIVE
RETICS # AUTO: 0.05 M/UL (ref 0.04–0.06)
RETICS/RBC NFR: 1.1 % (ref 0.8–2.1)
SODIUM SERPL-SCNC: 138 MMOL/L (ref 135–145)
SP GR UR STRIP.AUTO: 1.01
TIBC SERPL-MCNC: 356 UG/DL (ref 250–450)
TRIGL SERPL-MCNC: 160 MG/DL (ref 0–149)
VIT B12 SERPL-MCNC: 191 PG/ML (ref 211–911)
WBC # BLD AUTO: 5.5 K/UL (ref 4.8–10.8)
WBC #/AREA URNS HPF: ABNORMAL /HPF

## 2017-04-07 PROCEDURE — 83540 ASSAY OF IRON: CPT

## 2017-04-07 PROCEDURE — 82607 VITAMIN B-12: CPT

## 2017-04-07 PROCEDURE — 83550 IRON BINDING TEST: CPT

## 2017-04-07 PROCEDURE — 80061 LIPID PANEL: CPT

## 2017-04-07 PROCEDURE — 82728 ASSAY OF FERRITIN: CPT

## 2017-04-07 PROCEDURE — 85046 RETICYTE/HGB CONCENTRATE: CPT

## 2017-04-07 PROCEDURE — 81001 URINALYSIS AUTO W/SCOPE: CPT

## 2017-04-07 PROCEDURE — 83036 HEMOGLOBIN GLYCOSYLATED A1C: CPT

## 2017-04-07 PROCEDURE — 36415 COLL VENOUS BLD VENIPUNCTURE: CPT

## 2017-04-07 PROCEDURE — 85025 COMPLETE CBC W/AUTO DIFF WBC: CPT

## 2017-04-07 PROCEDURE — 82746 ASSAY OF FOLIC ACID SERUM: CPT

## 2017-04-07 PROCEDURE — 82043 UR ALBUMIN QUANTITATIVE: CPT

## 2017-04-07 PROCEDURE — 80053 COMPREHEN METABOLIC PANEL: CPT

## 2017-04-07 PROCEDURE — 82570 ASSAY OF URINE CREATININE: CPT

## 2017-04-26 ENCOUNTER — OFFICE VISIT (OUTPATIENT)
Dept: ENDOCRINOLOGY | Facility: MEDICAL CENTER | Age: 62
End: 2017-04-26
Payer: MEDICARE

## 2017-04-26 VITALS
BODY MASS INDEX: 28.92 KG/M2 | WEIGHT: 202 LBS | OXYGEN SATURATION: 96 % | HEART RATE: 100 BPM | DIASTOLIC BLOOD PRESSURE: 56 MMHG | SYSTOLIC BLOOD PRESSURE: 114 MMHG | HEIGHT: 70 IN

## 2017-04-26 DIAGNOSIS — R79.89 LOW TESTOSTERONE LEVEL IN MALE: ICD-10-CM

## 2017-04-26 DIAGNOSIS — Z79.4 TYPE 2 DIABETES MELLITUS WITHOUT COMPLICATION, WITH LONG-TERM CURRENT USE OF INSULIN (HCC): ICD-10-CM

## 2017-04-26 DIAGNOSIS — E11.9 TYPE 2 DIABETES MELLITUS WITHOUT COMPLICATION, WITH LONG-TERM CURRENT USE OF INSULIN (HCC): ICD-10-CM

## 2017-04-26 DIAGNOSIS — E03.8 SUBCLINICAL HYPOTHYROIDISM: ICD-10-CM

## 2017-04-26 DIAGNOSIS — E78.2 MIXED HYPERLIPIDEMIA: ICD-10-CM

## 2017-04-26 PROCEDURE — G8417 CALC BMI ABV UP PARAM F/U: HCPCS | Performed by: INTERNAL MEDICINE

## 2017-04-26 PROCEDURE — 3044F HG A1C LEVEL LT 7.0%: CPT | Performed by: INTERNAL MEDICINE

## 2017-04-26 PROCEDURE — 3017F COLORECTAL CA SCREEN DOC REV: CPT | Performed by: INTERNAL MEDICINE

## 2017-04-26 PROCEDURE — 99214 OFFICE O/P EST MOD 30 MIN: CPT | Performed by: INTERNAL MEDICINE

## 2017-04-26 PROCEDURE — 1036F TOBACCO NON-USER: CPT | Performed by: INTERNAL MEDICINE

## 2017-04-26 NOTE — MR AVS SNAPSHOT
"        Edward Ohara   2017 11:00 AM   Office Visit   MRN: [id number]    Department:  Endocrinology Med [facility]   Dept Phone:  [phone]    Description:  Male : 1955   Provider:  Sonal Arriaga R.N.; Kacie Weston M.D.           Reason for Visit     Follow-Up Diabetes      Allergies as of 2017     Allergen Noted Reactions    Oxycodone 2016       Hallucination  Rxn = May 2016      You were diagnosed with     Type 2 diabetes mellitus without complication, with long-term current use of insulin (CMS-HCC)   [6878306]       Subclinical hypothyroidism   [285301]       Mixed hyperlipidemia   [272.2.ICD-9-CM]       Low testosterone level in male   [5837912]         Vital Signs     Blood Pressure Pulse Height Weight Body Mass Index Oxygen Saturation    114/56 mmHg 100 1.778 m (5' 10\") 91.627 kg (202 lb) 28.98 kg/m2 96%    Smoking Status                   Never Smoker            Basic Information     Date Of Birth Sex Race Ethnicity Preferred Language    1955 Male White Non- English      Your appointments     May 11, 2017  1:00 PM   Follow UP with Abdullahi Xavier M.D.   [facility] (--)    [address] [id number] [phone]              Problem List              ICD-10-CM Priority Class Noted - Resolved    Type 2 diabetes mellitus (CMS-HCC) E11.9 Medium  2015 - Present    Cellulitis of left foot L03.116   2015 - Present    HIV (human immunodeficiency virus infection) (CMS-HCC) Z21   2015 - Present    DKA (diabetic ketoacidoses) (CMS-HCC) E13.10   2015 - Present    Low back pain M54.5   2015 - Present    Rash R21   2015 - Present    Depression F32.9   2015 - Present    Closed displaced fracture of navicular bone of left foot S92.252A   2016 - Present    Atrial fibrillation with rapid ventricular response (CMS-HCC) I48.91   2016 - Present    Peripheral neuropathy (CMS-HCC) G62.9 " Medium  6/5/2016 - Present    SARAY (obstructive sleep apnea) G47.33   6/15/2016 - Present    HIV infection (CMS-HCC) Z21   6/15/2016 - Present    Type 2 diabetes mellitus without complication (CMS-HCC) E11.9   6/15/2016 - Present    Atrial fibrillation status post cardioversion (CMS-HCC) I48.91   6/15/2016 - Present    Excessive daytime sleepiness G47.19   6/15/2016 - Present    Charcot foot due to diabetes mellitus (CMS-HCC) E11.610   7/12/2016 - Present    Infected wound T14.8, L08.9   7/12/2016 - Present    AIDS (CMS-HCC) B20 Medium  7/13/2016 - Present    Anemia D64.9 Medium  7/13/2016 - Present    Hypokalemia E87.6 Medium  7/13/2016 - Present    Chronic osteomyelitis of left foot (CMS-HCC) M86.672   10/17/2016 - Present    Microcytic anemia D50.9   12/11/2016 - Present    Osteomyelitis of foot, left, acute (CMS-HCC) M86.172 High  12/11/2016 - Present    Closed fracture of navicular bone of left foot S92.252A   12/22/2016 - Present    Charcot's joint of ankle M14.679   12/22/2016 - Present    AIDS (acquired immune deficiency syndrome) (CMS-HCC) B20   1/5/2017 - Present      Health Maintenance        Date Due Completion Dates    IMM HEP B VACCINE (1 of 3 - Primary Series) 1955 ---    DIABETES MONOFILAMENT / LE EXAM 4/15/1956 ---    IMM DTaP/Tdap/Td Vaccine (1 - Tdap) 10/15/1975 ---    COLONOSCOPY 10/15/2006 ---    RETINAL SCREENING 4/2/2016 4/2/2015 (Done)    Override on 4/2/2015: Done    IMM PNEUMOCOCCAL 19-64 (ADULT) HIGHEST RISK SERIES (2 of 3 - PCV13) 6/25/2016 6/25/2015    IMM ZOSTER VACCINE 10/15/2016 ---    A1C SCREENING 10/7/2017 4/7/2017, 7/12/2016, 6/5/2016, 2/16/2016, 12/3/2015, 11/13/2015, 9/15/2015, 6/25/2015, 5/6/2015, 2/27/2015    FASTING LIPID PROFILE 4/7/2018 4/7/2017, 7/14/2016, 6/5/2016, 2/8/2016, 12/3/2015, 2/27/2015    URINE ACR / MICROALBUMIN 4/7/2018 4/7/2017, 10/25/2016, 12/3/2015, 2/27/2015    SERUM CREATININE 4/7/2018 4/7/2017, 1/5/2017, 12/23/2016, 12/22/2016, 12/11/2016,  "12/10/2016, 10/19/2016, 9/26/2016, 9/19/2016, 9/12/2016, 9/7/2016, 8/31/2016, 8/24/2016, 8/20/2016, 8/19/2016, 7/17/2016, 7/16/2016, 7/14/2016, 7/13/2016, 7/12/2016, 7/9/2016, 6/5/2016, 6/4/2016, 5/24/2016, 2/8/2016, 12/3/2015, 11/14/2015, 11/13/2015, 11/13/2015, 11/13/2015, 11/12/2015, 9/17/2015, 9/16/2015, 9/15/2015, 6/25/2015, 6/24/2015, 2/27/2015            Current Immunizations     Influenza TIV (IM) 9/9/2015, 9/9/2014    Influenza Vaccine Adult HD 11/5/2015    Pneumococcal polysaccharide vaccine (PPSV-23) 6/25/2015  5:12 PM      Below and/or attached are the medications your provider expects you to take. Review all of your home medications and newly ordered medications with your provider and/or pharmacist. Follow medication instructions as directed by your provider and/or pharmacist. Please keep your medication list with you and share with your provider. Update the information when medications are discontinued, doses are changed, or new medications (including over-the-counter products) are added; and carry medication information at all times in the event of emergency situations     Allergies:  OXYCODONE - (reactions not documented)               Medications  Valid as of: April 26, 2017 -  1:02 PM    Generic Name Brand Name Tablet Size Instructions for use    Acetaminophen (Tab) TYLENOL 500 MG Take 1,000 mg by mouth every 6 hours as needed.        Acyclovir (Tab) ZOVIRAX 800 MG Take 1 Tab by mouth 2 times a day.        Emtricitabine-Tenofovir DF (Tab) TRUVADA 200-300 MG Take 1 Tab by mouth every day.        Ferrous Sulfate (Tab) Iron 325 (65 FE) MG Take 1 Tab by mouth 3 times a day, with meals.        Insulin NPH Human (Isophane) (Suspension) HUMULIN,NOVOLIN 100 UNIT/ML Inject 40 Units as instructed 2 Times a Day. Pt takes:   30 units at 1200  30 units HS        Insulin Regular Human (Solution) HUMULIN R 100 Unit/mL Inject 5-30 Units as instructed 3 times a day before meals. Pt states \"I use a sliding scale\".   " "       Insulin Regular Human (Solution) NOVOLIN R 100 UNIT/ML INJECT 5-15 UNITS AS INSTRUCTED 3 TIMES A DAY BEFORE MEALS.        MetFORMIN HCl (Tab) GLUCOPHAGE 1000 MG TAKE 1 TAB BY MOUTH 2 TIMES A DAY, WITH MEALS.        Multiple Vitamin (Tab) THERAGRAN  Take 2 Tabs by mouth every day. Indications: pt has been taking \"for years\"        PARoxetine HCl (Tab) PAXIL 20 MG Take 1 Tab by mouth every day.        Sulfamethoxazole-Trimethoprim (Tab) BACTRIM -160 MG Take 1 Tab by mouth every day.        .                 Medicines prescribed today were sent to:     [facility][id number] - [place], [place] - [address]    [address] [place] [address]    Phone: [phone] Fax: [phone]    Open 24 Hours?: No      Medication refill instructions:       If your prescription bottle indicates you have medication refills left, it is not necessary to call your provider’s office. Please contact your pharmacy and they will refill your medication.    If your prescription bottle indicates you do not have any refills left, you may request refills at any time through one of the following ways: The online [employer] system (except Urgent Care), by calling your provider’s office, or by asking your pharmacy to contact your provider’s office with a refill request. Medication refills are processed only during regular business hours and may not be available until the next business day. Your provider may request additional information or to have a follow-up visit with you prior to refilling your medication.   *Please Note: Medication refills are assigned a new Rx number when refilled electronically. Your pharmacy may indicate that no refills were authorized even though a new prescription for the same medication is available at the pharmacy. Please request the medicine by name with the pharmacy before contacting your provider for a refill.        Your To Do List     Future Labs/Procedures Complete By Expires    FREE THYROXINE  As directed " 4/26/2018    TESTOSTERONE F&T MALE ADULT  As directed 4/26/2018    TSH  As directed 4/26/2018         MyChart Access Code: Activation code not generated  Current [facility] Status: Active

## 2017-04-26 NOTE — PROGRESS NOTES
Endocrinology Clinic Progress Note  PCP: Kong Jha M.D.    CC: Diabetes    HPI:  Edward Ohara is a 61 y.o. old patient who comes in today for routine follow up.     Type 2 diabetes: He is currently on NPH 50 units twice a day, regular insulin 5-15 units with each meal, metformin 1000 mg twice a day. He reports compliance with medications. He checks blood sugars one to 2 times a day. Fasting blood sugar in the morning his most in the range of . No hypoglycemia. The meal blood sugar readings are well controlled as well, however he rarely checks these. He denies numbness or tingling in feet. He is due for her. Exam.    Hyperlipidemia: He is currently not on statin. . He is reluctant to start started due to potential for side effects.    History of low testosterone: He would like to get his testosterone levels repeated.    Subclinical hypothyroidism: No significant hypothyroid symptoms. He is not on thyroid hormone replacement.    ROS:  Constitutional: No unintentional weight loss  Endo: Denies excessive thirst or frequent urination    Past Medical History:  Patient Active Problem List    Diagnosis Date Noted   • Osteomyelitis of foot, left, acute (CMS-HCC) 12/11/2016     Priority: High   • AIDS (CMS-HCC) 07/13/2016     Priority: Medium   • Anemia 07/13/2016     Priority: Medium   • Hypokalemia 07/13/2016     Priority: Medium   • Peripheral neuropathy (CMS-HCC) 06/05/2016     Priority: Medium   • Type 2 diabetes mellitus (CMS-HCC) 05/06/2015     Priority: Medium   • AIDS (acquired immune deficiency syndrome) (CMS-HCC) 01/05/2017   • Closed fracture of navicular bone of left foot 12/22/2016   • Charcot's joint of ankle 12/22/2016   • Microcytic anemia 12/11/2016   • Chronic osteomyelitis of left foot (CMS-HCC) 10/17/2016   • Charcot foot due to diabetes mellitus (CMS-HCC) 07/12/2016   • Infected wound 07/12/2016   • SARAY (obstructive sleep apnea) 06/15/2016   • HIV infection (CMS-HCC)  "06/15/2016   • Type 2 diabetes mellitus without complication (CMS-HCC) 06/15/2016   • Atrial fibrillation status post cardioversion (CMS-HCC) 06/15/2016   • Excessive daytime sleepiness 06/15/2016   • Atrial fibrillation with rapid ventricular response (CMS-HCC) 06/04/2016   • Closed displaced fracture of navicular bone of left foot 05/26/2016   • Low back pain 11/13/2015   • Rash 11/13/2015   • Depression 11/13/2015   • DKA (diabetic ketoacidoses) (CMS-HCC) 11/12/2015   • HIV (human immunodeficiency virus infection) (CMS-HCC) 08/27/2015   • Cellulitis of left foot 06/24/2015       Medications:    Current outpatient prescriptions:   •  NOVOLIN R 100 UNIT/ML Solution, INJECT 5-15 UNITS AS INSTRUCTED 3 TIMES A DAY BEFORE MEALS., Disp: 20 mL, Rfl: 2  •  metformin (GLUCOPHAGE) 1000 MG tablet, TAKE 1 TAB BY MOUTH 2 TIMES A DAY, WITH MEALS., Disp: 180 Tab, Rfl: 3  •  sulfamethoxazole-trimethoprim (BACTRIM DS) 800-160 MG tablet, Take 1 Tab by mouth every day., Disp: 30 Tab, Rfl: 0  •  Ferrous Sulfate (IRON) 325 (65 FE) MG Tab, Take 1 Tab by mouth 3 times a day, with meals., Disp: 90 Tab, Rfl: 0  •  insulin regular (HUMULIN R) 100 Unit/mL Solution, Inject 5-30 Units as instructed 3 times a day before meals. Pt states \"I use a sliding scale\"., Disp: , Rfl:   •  emtricitabine-tenofovir (TRUVADA) 200-300 MG per tablet, Take 1 Tab by mouth every day., Disp: 30 Tab, Rfl: 3  •  multivitamin (THERAGRAN) Tab, Take 2 Tabs by mouth every day. Indications: pt has been taking \"for years\", Disp: , Rfl:   •  paroxetine (PAXIL) 20 MG TABS, Take 1 Tab by mouth every day., Disp: 30 Tab, Rfl: 3  •  acyclovir (ZOVIRAX) 800 MG Tab, Take 1 Tab by mouth 2 times a day. (Patient taking differently: Take 400 mg by mouth 3 times a day.), Disp: 10 Tab, Rfl: 0  •  acetaminophen (TYLENOL) 500 MG Tab, Take 1,000 mg by mouth every 6 hours as needed., Disp: , Rfl:   •  insulin NPH (NOVOLIN N RELION) 100 UNIT/ML Suspension, Inject 40 Units as instructed 2 " "Times a Day. Pt takes:  30 units at 1200 30 units HS, Disp: , Rfl:     Labs:   Results for RICA LOAIZA (MRN [id number]) as of 4/26/2017 12:38   Ref. Range 1/6/2017 01:20 4/7/2017 07:59   Sodium Latest Ref Range: 135-145 mmol/L  138   Potassium Latest Ref Range: 3.6-5.5 mmol/L  4.6   Chloride Latest Ref Range:  mmol/L  104   Co2 Latest Ref Range: 20-33 mmol/L  27   Anion Gap Latest Ref Range: 0.0-11.9   7.0   Glucose Latest Ref Range: 65-99 mg/dL  148 (H)   Bun Latest Ref Range: 8-22 mg/dL  18   Creatinine Latest Ref Range: 0.50-1.40 mg/dL  0.57   GFR If  Latest Ref Range: >60 mL/min/1.73 m 2  >60   GFR If Non  Latest Ref Range: >60 mL/min/1.73 m 2  >60   Calcium Latest Ref Range: 8.5-10.5 mg/dL  9.4   AST(SGOT) Latest Ref Range: 12-45 U/L  33   ALT(SGPT) Latest Ref Range: 2-50 U/L  41   Alkaline Phosphatase Latest Ref Range: 30-99 U/L  133 (H)   Total Bilirubin Latest Ref Range: 0.1-1.5 mg/dL  0.3   Albumin Latest Ref Range: 3.2-4.9 g/dL  4.1   Total Protein Latest Ref Range: 6.0-8.2 g/dL  7.4   Globulin Latest Ref Range: 1.9-3.5 g/dL  3.3   A-G Ratio Latest Units: g/dL  1.2   Magnesium Latest Ref Range: 1.5-2.5 mg/dL 1.6    Ammonia Latest Ref Range: 11-45 umol/L 24    Iron Latest Ref Range:  ug/dL  119   Total Iron Binding Latest Ref Range: 250-450 ug/dL  356   % Saturation Latest Ref Range: 15-55 %  33   Glycohemoglobin Latest Ref Range: 0.0-5.6 %  6.8 (H)   Estim. Avg Glu Latest Units: mg/dL  148   Cholesterol,Tot Latest Ref Range: 100-199 mg/dL  220 (H)   Triglycerides Latest Ref Range: 0-149 mg/dL  160 (H)   HDL Latest Ref Range: >=40 mg/dL  58   LDL Latest Ref Range: <100 mg/dL  130 (H)   Micro Alb Creat Ratio Latest Ref Range: 0-30 mg/g  486 (H)   Creatinine, Urine Latest Units: mg/dL  49.00   Microalbumin, Urine Random Latest Units: mg/dL  23.8     Physical Examination:  Vital signs: /56 mmHg  Pulse 100  Ht 1.778 m (5' 10\")  Wt 91.627 kg (202 " lb)  BMI 28.98 kg/m2  SpO2 96%  General: No apparent distress, cooperative  Eyes: No scleral icterus, no discharge, normal eyelids  Neck: No abnormal masses on inspection   Resp: Normal effort  Skin: No rash on visible skin  Psych: Alert and oriented, normal mood and affect    Assessment and Plan:    1. Type 2 diabetes mellitus without complication, with long-term current use of insulin (CMS-[facility])  · Recent hemoglobin A1c 6.8%  · Goal hemoglobin A1c less than 7%  · Continue NPH 50 units twice a day, regular insulin 5-15 units 3 times a day with meals and metformin 1000 mg twice a day  · Advised to check blood sugars at least 3 times a day    2. Subclinical hypothyroidism  · Repeat labs:  - TSH; Future  - FREE THYROXINE; Future    3. Mixed hyperlipidemia  · We discussed about starting statin, he is reluctant to start statin at this time  ·     4. Low testosterone level in male  · Repeat labs:  - TESTOSTERONE F&T MALE ADULT; Future    Return in about 4 months (around 8/26/2017).    Thank you for allowing me to participate in the care of this patient.    Kacie Weston M.D.    CC:   Kong Jha M.D.    This note was created using voice recognition software (Dragon). The accuracy of the dictation is limited by the abilities of the software. I have reviewed the note prior to signing, however some errors in grammar and context are still possible. If you have any questions related to this note please do not hesitate to contact our office.

## 2017-05-05 ENCOUNTER — HOSPITAL ENCOUNTER (OUTPATIENT)
Dept: LAB | Facility: MEDICAL CENTER | Age: 62
End: 2017-05-05
Attending: INTERNAL MEDICINE
Payer: MEDICARE

## 2017-05-05 DIAGNOSIS — R79.89 LOW TESTOSTERONE LEVEL IN MALE: ICD-10-CM

## 2017-05-05 DIAGNOSIS — E03.8 SUBCLINICAL HYPOTHYROIDISM: ICD-10-CM

## 2017-05-05 LAB
T4 FREE SERPL-MCNC: 0.56 NG/DL (ref 0.53–1.43)
TSH SERPL DL<=0.005 MIU/L-ACNC: 7.07 UIU/ML (ref 0.3–3.7)

## 2017-05-05 PROCEDURE — 36415 COLL VENOUS BLD VENIPUNCTURE: CPT

## 2017-05-05 PROCEDURE — 84402 ASSAY OF FREE TESTOSTERONE: CPT

## 2017-05-05 PROCEDURE — 84443 ASSAY THYROID STIM HORMONE: CPT

## 2017-05-05 PROCEDURE — 84439 ASSAY OF FREE THYROXINE: CPT

## 2017-05-05 PROCEDURE — 84270 ASSAY OF SEX HORMONE GLOBUL: CPT

## 2017-05-05 PROCEDURE — 84403 ASSAY OF TOTAL TESTOSTERONE: CPT

## 2017-05-07 LAB
SHBG SERPL-SCNC: 30 NMOL/L (ref 11–80)
TESTOST FREE MFR SERPL: 1.8 % (ref 1.6–2.9)
TESTOST FREE SERPL-MCNC: 42 PG/ML (ref 47–244)
TESTOST SERPL-MCNC: 226 NG/DL (ref 300–720)

## 2017-05-09 DIAGNOSIS — R79.89 LOW TESTOSTERONE LEVEL IN MALE: ICD-10-CM

## 2017-05-09 DIAGNOSIS — E03.8 SUBCLINICAL HYPOTHYROIDISM: ICD-10-CM

## 2017-05-10 ENCOUNTER — TELEPHONE (OUTPATIENT)
Dept: ENDOCRINOLOGY | Facility: MEDICAL CENTER | Age: 62
End: 2017-05-10

## 2017-05-10 NOTE — TELEPHONE ENCOUNTER
----- Message from Kacie Weston M.D. sent at 5/9/2017  2:18 PM PDT -----  Thyroid hormone levels came back lower than before, TSH is up at 7. I would recommend to start taking thyroid hormone replacement with levothyroxine 50 µg daily. I will send a prescription for levothyroxine. Take levothyroxine on empty stomach in the morning, at least 30 minutes before breakfast.  Testosterone level came back low at 226. Repeat labs for testosterone level and thyroid hormone levels 2-3 months, get the blood work done around 8:00 in the morning.

## 2017-05-11 ENCOUNTER — SLEEP CENTER VISIT (OUTPATIENT)
Dept: SLEEP MEDICINE | Facility: MEDICAL CENTER | Age: 62
End: 2017-05-11
Payer: MEDICARE

## 2017-05-11 VITALS
RESPIRATION RATE: 16 BRPM | WEIGHT: 200 LBS | HEART RATE: 93 BPM | SYSTOLIC BLOOD PRESSURE: 120 MMHG | HEIGHT: 70 IN | OXYGEN SATURATION: 94 % | DIASTOLIC BLOOD PRESSURE: 70 MMHG | BODY MASS INDEX: 28.63 KG/M2

## 2017-05-11 DIAGNOSIS — G47.33 OBSTRUCTIVE SLEEP APNEA: ICD-10-CM

## 2017-05-11 PROCEDURE — 99213 OFFICE O/P EST LOW 20 MIN: CPT | Performed by: INTERNAL MEDICINE

## 2017-05-11 NOTE — MR AVS SNAPSHOT
"        Edward Ohara   2017 1:00 PM   Sleep Center Visit   MRN: [id number]    Department:  [facility]   Dept Phone:  [phone]    Description:  Male : 1955   Provider:  Abdullahi Xavier M.D.           Reason for Visit     Follow-Up SARAY      Allergies as of 2017     Allergen Noted Reactions    Oxycodone 2016       Hallucination  Rxn = May 2016      Vital Signs     Blood Pressure Pulse Respirations Height Weight Body Mass Index    120/70 mmHg 93 16 1.778 m (5' 10\") 90.719 kg (200 lb) 28.70 kg/m2    Oxygen Saturation Smoking Status                94% Never Smoker           Basic Information     Date Of Birth Sex Race Ethnicity Preferred Language    1955 Male White Non- English      Your appointments     Nov 15, 2017 11:00 AM   Follow UP with Abdullahi Xavier M.D.   [facility] (--)    [address] [place] [id number] [phone]              Problem List              ICD-10-CM Priority Class Noted - Resolved    Type 2 diabetes mellitus (CMS-[facility]) E11.9 Medium  2015 - Present    Cellulitis of left foot L03.116   2015 - Present    HIV (human immunodeficiency virus infection) (CMS-[facility]) Z21   2015 - Present    DKA (diabetic ketoacidoses) (CMS-HCC) E13.10   2015 - Present    Low back pain M54.5   2015 - Present    Rash R21   2015 - Present    Depression F32.9   2015 - Present    Closed displaced fracture of navicular bone of left foot S92.252A   2016 - Present    Atrial fibrillation with rapid ventricular response (CMS-[facility]) I48.91   2016 - Present    Peripheral neuropathy G62.9 Medium  2016 - Present    SARAY (obstructive sleep apnea) G47.33   6/15/2016 - Present    HIV infection (CMS-HCC) Z21   6/15/2016 - Present    Type 2 diabetes mellitus without complication (CMS-[facility]) E11.9   6/15/2016 - Present    Atrial fibrillation status post cardioversion (CMS-HCC) I48.91   6/15/2016 - " Present    Excessive daytime sleepiness G47.19   6/15/2016 - Present    Charcot foot due to diabetes mellitus (CMS-HCC) E11.610   7/12/2016 - Present    Infected wound T14.8, L08.9   7/12/2016 - Present    AIDS (CMS-HCC) B20 Medium  7/13/2016 - Present    Anemia D64.9 Medium  7/13/2016 - Present    Hypokalemia E87.6 Medium  7/13/2016 - Present    Chronic osteomyelitis of left foot (CMS-HCC) M86.672   10/17/2016 - Present    Microcytic anemia D50.9   12/11/2016 - Present    Osteomyelitis of foot, left, acute (CMS-HCC) M86.172 High  12/11/2016 - Present    Closed fracture of navicular bone of left foot S92.252A   12/22/2016 - Present    Charcot's joint of ankle M14.679   12/22/2016 - Present    AIDS (acquired immune deficiency syndrome) (CMS-HCC) B20   1/5/2017 - Present      Health Maintenance        Date Due Completion Dates    IMM HEP B VACCINE (1 of 3 - Primary Series) 1955 ---    DIABETES MONOFILAMENT / LE EXAM 4/15/1956 ---    IMM DTaP/Tdap/Td Vaccine (1 - Tdap) 10/15/1975 ---    COLONOSCOPY 10/15/2006 ---    RETINAL SCREENING 4/2/2016 4/2/2015 (Done)    Override on 4/2/2015: Done    IMM PNEUMOCOCCAL 19-64 (ADULT) HIGHEST RISK SERIES (2 of 3 - PCV13) 6/25/2016 6/25/2015    IMM ZOSTER VACCINE 10/15/2016 ---    A1C SCREENING 10/7/2017 4/7/2017, 7/12/2016, 6/5/2016, 2/16/2016, 12/3/2015, 11/13/2015, 9/15/2015, 6/25/2015, 5/6/2015, 2/27/2015    FASTING LIPID PROFILE 4/7/2018 4/7/2017, 7/14/2016, 6/5/2016, 2/8/2016, 12/3/2015, 2/27/2015    URINE ACR / MICROALBUMIN 4/7/2018 4/7/2017, 10/25/2016, 12/3/2015, 2/27/2015    SERUM CREATININE 4/7/2018 4/7/2017, 1/5/2017, 12/23/2016, 12/22/2016, 12/11/2016, 12/10/2016, 10/19/2016, 9/26/2016, 9/19/2016, 9/12/2016, 9/7/2016, 8/31/2016, 8/24/2016, 8/20/2016, 8/19/2016, 7/17/2016, 7/16/2016, 7/14/2016, 7/13/2016, 7/12/2016, 7/9/2016, 6/5/2016, 6/4/2016, 5/24/2016, 2/8/2016, 12/3/2015, 11/14/2015, 11/13/2015, 11/13/2015, 11/13/2015, 11/12/2015, 9/17/2015, 9/16/2015,  "9/15/2015, 6/25/2015, 6/24/2015, 2/27/2015            Current Immunizations     Influenza TIV (IM) 9/9/2015, 9/9/2014    Influenza Vaccine Adult HD 11/5/2015    Pneumococcal polysaccharide vaccine (PPSV-23) 6/25/2015  5:12 PM      Below and/or attached are the medications your provider expects you to take. Review all of your home medications and newly ordered medications with your provider and/or pharmacist. Follow medication instructions as directed by your provider and/or pharmacist. Please keep your medication list with you and share with your provider. Update the information when medications are discontinued, doses are changed, or new medications (including over-the-counter products) are added; and carry medication information at all times in the event of emergency situations     Allergies:  OXYCODONE - (reactions not documented)               Medications  Valid as of: May 11, 2017 -  1:34 PM    Generic Name Brand Name Tablet Size Instructions for use    Acetaminophen (Tab) TYLENOL 500 MG Take 1,000 mg by mouth every 6 hours as needed.        Acyclovir (Tab) ZOVIRAX 800 MG Take 1 Tab by mouth 2 times a day.        Emtricitabine-Tenofovir DF (Tab) TRUVADA 200-300 MG Take 1 Tab by mouth every day.        Ferrous Sulfate (Tab) Iron 325 (65 FE) MG Take 1 Tab by mouth 3 times a day, with meals.        Insulin NPH Human (Isophane) (Suspension) HUMULIN,NOVOLIN 100 UNIT/ML Inject 40 Units as instructed 2 Times a Day. Pt takes:   30 units at 1200  30 units HS        Insulin Regular Human (Solution) HUMULIN R 100 Unit/mL Inject 5-30 Units as instructed 3 times a day before meals. Pt states \"I use a sliding scale\".        Insulin Regular Human (Solution) NOVOLIN R 100 UNIT/ML INJECT 5-15 UNITS AS INSTRUCTED 3 TIMES A DAY BEFORE MEALS.        MetFORMIN HCl (Tab) GLUCOPHAGE 1000 MG TAKE 1 TAB BY MOUTH 2 TIMES A DAY, WITH MEALS.        Multiple Vitamin (Tab) THERAGRAN  Take 2 Tabs by mouth every day. Indications: pt has been " "taking \"for years\"        PARoxetine HCl (Tab) PAXIL 20 MG Take 1 Tab by mouth every day.        Sulfamethoxazole-Trimethoprim (Tab) BACTRIM -160 MG Take 1 Tab by mouth every day.        .                 Medicines prescribed today were sent to:     [id number] - [place], [place] - [address]    [address] [place] [address]    Phone: [phone] Fax: [phone]    Open 24 Hours?: No      Medication refill instructions:       If your prescription bottle indicates you have medication refills left, it is not necessary to call your provider’s office. Please contact your pharmacy and they will refill your medication.    If your prescription bottle indicates you do not have any refills left, you may request refills at any time through one of the following ways: The online [employer] system (except Urgent Care), by calling your provider’s office, or by asking your pharmacy to contact your provider’s office with a refill request. Medication refills are processed only during regular business hours and may not be available until the next business day. Your provider may request additional information or to have a follow-up visit with you prior to refilling your medication.   *Please Note: Medication refills are assigned a new Rx number when refilled electronically. Your pharmacy may indicate that no refills were authorized even though a new prescription for the same medication is available at the pharmacy. Please request the medicine by name with the pharmacy before contacting your provider for a refill.           [employer] Access Code: Activation code not generated  Current [employer] Status: Active          "

## 2017-05-11 NOTE — PROGRESS NOTES
Edward Ohara is a 61 y.o. male here for obstructive sleep apnea.    History of Present Illness:    The patient is a 61-year-old male who has obstructive sleep apnea. He has an apnea hypopnea index of 26 per hour and a low oxygen saturation of 88%. He is on CPAP of 8 cm of water. He's had some other medical issues. He does have HIV and diabetes and neuropathy. 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 And because of this he has not been very compliant with his CPAP device. He has to keep his stump elevated to reduce swelling and he's been using the bedroom for his living room and his sleep hygiene has decreased. These things seem to be getting better and he feels like he can get back on his usual routine fairly soon.    Constitutional:  Negative for fever, chills, sweats, and fatigue.  Eyes:  Negative for eye pain and visual changes.  HENT:  Negative for tinnitus and hoarse voice.  Cardiovascular:  Negative for chest pain, leg swelling, syncope and orthopnea.  Respiratory:  See HPI for pertinent negatives  Sleep:  Negative for somnolence, loud snoring, sleep disturbance due to breathing, insomnia.  Gastrointestinal:  Negative for dysphagia, nausea and abdominal pain.  Heme/lymph:  Denies easy bruising, blood clots.  Musculoskeletal:  Negative for arthralgias, sore muscles and back pain.  Skin:  Negative for rash and color change.  Neurological:  Negative for headaches, lightheadedness and weakness.  Psychiatric:  Denies depression.    Current Outpatient Prescriptions   Medication Sig Dispense Refill   • NOVOLIN R 100 UNIT/ML Solution INJECT 5-15 UNITS AS INSTRUCTED 3 TIMES A DAY BEFORE MEALS. 20 mL 2   • metformin (GLUCOPHAGE) 1000 MG tablet TAKE 1 TAB BY MOUTH 2 TIMES A DAY, WITH MEALS. 180 Tab 3   • sulfamethoxazole-trimethoprim (BACTRIM DS) 800-160 MG tablet Take 1 Tab by mouth every day. 30 Tab 0   • Ferrous Sulfate (IRON) 325 (65 FE) MG Tab Take 1 Tab by mouth 3 times a day, with meals. 90  "Tab 0   • emtricitabine-tenofovir (TRUVADA) 200-300 MG per tablet Take 1 Tab by mouth every day. 30 Tab 3   • multivitamin (THERAGRAN) Tab Take 2 Tabs by mouth every day. Indications: pt has been taking \"for years\"     • paroxetine (PAXIL) 20 MG TABS Take 1 Tab by mouth every day. 30 Tab 3   • acyclovir (ZOVIRAX) 800 MG Tab Take 1 Tab by mouth 2 times a day. (Patient taking differently: Take 400 mg by mouth 3 times a day.) 10 Tab 0   • acetaminophen (TYLENOL) 500 MG Tab Take 1,000 mg by mouth every 6 hours as needed.     • insulin regular (HUMULIN R) 100 Unit/mL Solution Inject 5-30 Units as instructed 3 times a day before meals. Pt states \"I use a sliding scale\".     • insulin NPH (NOVOLIN N RELION) 100 UNIT/ML Suspension Inject 40 Units as instructed 2 Times a Day. Pt takes:   30 units at 1200  30 units HS       No current facility-administered medications for this visit.       Social History   Substance Use Topics   • Smoking status: Never Smoker    • Smokeless tobacco: Never Used   • Alcohol Use: No       Past Medical History   Diagnosis Date   • HIV (human immunodeficiency virus infection) (CMS-[facility])    • Diabetes (CMS-HCC)    • Neuropathy (CMS-HCC)    • Arthritis    • Renal disorder      5/4/2016,pt states not aware of any renal disorder   • Sleep apnea      diagnosed with sleep apnea,has cpap,lost 60 lbs,uses cpap sometimes   • Back pain      5/24/2016 states back pain not an issue at this time   • Bronchitis    • Depression    • Chickenpox    • Mumps    • Tonsillitis    • Whooping cough        Past Surgical History   Procedure Laterality Date   • Other abdominal surgery  1969     appendectomy   • Other orthopedic surgery       bilateral heel spurs removed   • Other orthopedic surgery Right 1967     ganglion cyst removed   • Other orthopedic surgery Bilateral 1994     juanita. carpal tunnel release   • Other  1974     tonsillectomy   • Other  1972     pilonidal cyst removed   • Other  1977     sinus surgery   • " "Ankle orif Left 5/26/2016     Procedure: ANKLE ORIF Navicular , talonavicular, gastrocnemius recession;  Surgeon: Abhishek Edwards M.D.;  Location: SURGERY [facility];  Service:    • Ankle fusion Left 5/26/2016     Procedure: ANKLE FUSION FOR: navicular cuneiform fusion , allograft ;  Surgeon: Abhishek Edwards M.D.;  Location: SURGERY [facility];  Service:    • Irrigation & debridement ortho Left 7/16/2016     Procedure: IRRIGATION & DEBRIDEMENT ORTHO FOOT WITH WOUND VAC;  Surgeon: Abhishek Edwards M.D.;  Location: SURGERY [facility];  Service:    • Carpal tunnel release     • Sinuscope     • Appendectomy     • Irrigation & debridement ortho Left 12/22/2016     Procedure: IRRIGATION & DEBRIDEMENT ORTHO FOOT;  Surgeon: Abhishek Edwards M.D.;  Location: SURGERY [facility];  Service:    • Hardware removal ortho Left 12/22/2016     Procedure: HARDWARE REMOVAL ORTHO;  Surgeon: Abhishek Edwards M.D.;  Location: SURGERY [facility];  Service:        Allergies:  Oxycodone    Family History   Problem Relation Age of Onset   • Heart Attack Mother    • Heart Disease Father    • Diabetes Mother    • Diabetes Father        Physical Examination    Filed Vitals:    05/11/17 1250   Height: 1.778 m (5' 10\")   Weight: 90.719 kg (200 lb)   Weight % change since last entry.: 0 %   BP: 120/70   Pulse: 93   BMI (Calculated): 28.7   Resp: 16       Physical Exam:  Constitutional:  Well developed and well nourished.  Head:  Normocephalic and atraumatic.  Nose:  Nose normal.  Mouth/Throat:  Oropharynx is clear and moist, no lesions.    Neck:  Normal range of motion.  Supple.  No JVD.  Cardiovascular:  Normal rate, regular rhythm, normal heart sounds. No edema  Pulmonary/Chest: No wheezing, rales or rhonchi.  Respiratory effort non labored  Musculoskeletal.  No muscular atrophy.  Lymphadenopathy:  No cervical or supraclavicular adenopathy  Neurological:  Alert and oriented.  Cranial nerves intact.  No focal " deficits  Skin:  No rashes or ulcers.  Psyciatric:  Normal mood and affect.    Assessment and Plan:  1. Obstructive sleep apnea  I reviewed his data card and when he is using the CPAP device the apnea hypopnea index is 0.5 per hour. The leak is minimal. I reassured the patient that the CPAP device appears to be effective and I have encouraged patient to use the device at nighttime and remember to put it on before he falls asleep at night. The patient is going to make a concerted effort to get back into the habit of using the machine. If he has any problems he'll let me know otherwise we will see him back in 6 months.      Followup Return in about 6 months (around 11/11/2017) for follow up visit with EMY.

## 2017-06-28 RX ORDER — LEVOTHYROXINE SODIUM 0.05 MG/1
50 TABLET ORAL
Qty: 30 TAB | Refills: 6 | Status: SHIPPED | OUTPATIENT
Start: 2017-06-28 | End: 2020-01-15

## 2017-06-29 DIAGNOSIS — Z79.4 TYPE 2 DIABETES MELLITUS WITHOUT COMPLICATION, WITH LONG-TERM CURRENT USE OF INSULIN (HCC): ICD-10-CM

## 2017-06-29 DIAGNOSIS — E11.9 TYPE 2 DIABETES MELLITUS WITHOUT COMPLICATION, WITH LONG-TERM CURRENT USE OF INSULIN (HCC): ICD-10-CM

## 2017-08-28 ENCOUNTER — HOSPITAL ENCOUNTER (EMERGENCY)
Dept: HOSPITAL 8 - ED | Age: 62
Discharge: HOME | End: 2017-08-28
Payer: COMMERCIAL

## 2017-08-28 VITALS — HEIGHT: 70 IN | WEIGHT: 201.28 LBS | BODY MASS INDEX: 28.82 KG/M2

## 2017-08-28 VITALS — DIASTOLIC BLOOD PRESSURE: 73 MMHG | SYSTOLIC BLOOD PRESSURE: 124 MMHG

## 2017-08-28 DIAGNOSIS — E11.65: Primary | ICD-10-CM

## 2017-08-28 DIAGNOSIS — E11.40: ICD-10-CM

## 2017-08-28 DIAGNOSIS — R41.3: ICD-10-CM

## 2017-08-28 DIAGNOSIS — Z88.5: ICD-10-CM

## 2017-08-28 DIAGNOSIS — N30.90: ICD-10-CM

## 2017-08-28 DIAGNOSIS — Z88.6: ICD-10-CM

## 2017-08-28 LAB
BUN SERPL-MCNC: 20 MG/DL (ref 7–18)
HCT VFR BLD CALC: 40.9 % (ref 39.2–51.8)
HGB BLD-MCNC: 13.8 G/DL (ref 13.7–18)
IS PT STATUS REG ER OR PRE ER?: YES
PATH.CAST-FLAG: (no result)
SPERM-FLAG: (no result)
SRC-FLAG: (no result)
WBC # BLD AUTO: 5.2 X10^3/UL (ref 3.4–10)
XTAL-FLAG: (no result)
YLC-FLAG: (no result)

## 2017-08-28 PROCEDURE — 82962 GLUCOSE BLOOD TEST: CPT

## 2017-08-28 PROCEDURE — 93005 ELECTROCARDIOGRAM TRACING: CPT

## 2017-08-28 PROCEDURE — 84484 ASSAY OF TROPONIN QUANT: CPT

## 2017-08-28 PROCEDURE — 36415 COLL VENOUS BLD VENIPUNCTURE: CPT

## 2017-08-28 PROCEDURE — 99285 EMERGENCY DEPT VISIT HI MDM: CPT

## 2017-08-28 PROCEDURE — 87086 URINE CULTURE/COLONY COUNT: CPT

## 2017-08-28 PROCEDURE — 85025 COMPLETE CBC W/AUTO DIFF WBC: CPT

## 2017-08-28 PROCEDURE — 82040 ASSAY OF SERUM ALBUMIN: CPT

## 2017-08-28 PROCEDURE — 81001 URINALYSIS AUTO W/SCOPE: CPT

## 2017-08-28 PROCEDURE — 80048 BASIC METABOLIC PNL TOTAL CA: CPT

## 2017-08-28 PROCEDURE — 70450 CT HEAD/BRAIN W/O DYE: CPT

## 2017-08-28 PROCEDURE — 83605 ASSAY OF LACTIC ACID: CPT

## 2017-09-12 ENCOUNTER — HOSPITAL ENCOUNTER (EMERGENCY)
Dept: HOSPITAL 8 - ED | Age: 62
Discharge: LEFT BEFORE DISCHARGE | End: 2017-09-12
Payer: COMMERCIAL

## 2017-09-12 VITALS — DIASTOLIC BLOOD PRESSURE: 71 MMHG | SYSTOLIC BLOOD PRESSURE: 115 MMHG

## 2017-09-12 VITALS — BODY MASS INDEX: 27.77 KG/M2 | WEIGHT: 194.01 LBS | HEIGHT: 70 IN

## 2017-09-12 DIAGNOSIS — Z91.14: ICD-10-CM

## 2017-09-12 DIAGNOSIS — E11.65: Primary | ICD-10-CM

## 2017-09-12 LAB
AST SERPL-CCNC: 24 U/L (ref 15–37)
BUN SERPL-MCNC: 20 MG/DL (ref 7–18)
HCT VFR BLD CALC: 42.8 % (ref 39.2–51.8)
HGB BLD-MCNC: 14.8 G/DL (ref 13.7–18)
PH BLDV: 7.27 PH (ref 7.32–7.42)
WBC # BLD AUTO: 4.5 X10^3/UL (ref 3.4–10)

## 2017-09-12 PROCEDURE — 80053 COMPREHEN METABOLIC PANEL: CPT

## 2017-09-12 PROCEDURE — 99284 EMERGENCY DEPT VISIT MOD MDM: CPT

## 2017-09-12 PROCEDURE — 85025 COMPLETE CBC W/AUTO DIFF WBC: CPT

## 2017-09-12 PROCEDURE — 82010 KETONE BODYS QUAN: CPT

## 2017-09-12 PROCEDURE — 82962 GLUCOSE BLOOD TEST: CPT

## 2017-09-12 PROCEDURE — 82803 BLOOD GASES ANY COMBINATION: CPT

## 2017-09-12 PROCEDURE — 36415 COLL VENOUS BLD VENIPUNCTURE: CPT

## 2017-10-03 ENCOUNTER — OFFICE VISIT (OUTPATIENT)
Dept: URGENT CARE | Facility: PHYSICIAN GROUP | Age: 62
End: 2017-10-03
Payer: MEDICARE

## 2017-10-03 ENCOUNTER — HOSPITAL ENCOUNTER (OUTPATIENT)
Facility: MEDICAL CENTER | Age: 62
End: 2017-10-03
Attending: FAMILY MEDICINE
Payer: MEDICARE

## 2017-10-03 VITALS
WEIGHT: 200 LBS | RESPIRATION RATE: 14 BRPM | TEMPERATURE: 98.1 F | SYSTOLIC BLOOD PRESSURE: 106 MMHG | OXYGEN SATURATION: 95 % | DIASTOLIC BLOOD PRESSURE: 64 MMHG | HEART RATE: 78 BPM | BODY MASS INDEX: 28.7 KG/M2

## 2017-10-03 DIAGNOSIS — A60.9 HSV (HERPES SIMPLEX VIRUS) ANOGENITAL INFECTION: ICD-10-CM

## 2017-10-03 PROCEDURE — 99214 OFFICE O/P EST MOD 30 MIN: CPT | Performed by: FAMILY MEDICINE

## 2017-10-03 PROCEDURE — 87529 HSV DNA AMP PROBE: CPT | Mod: 91

## 2017-10-03 RX ORDER — CEFDINIR 300 MG/1
CAPSULE ORAL
COMMUNITY
Start: 2017-08-30 | End: 2020-01-15

## 2017-10-03 RX ORDER — ACYCLOVIR 800 MG/1
800 TABLET ORAL
Qty: 50 TAB | Refills: 3 | Status: SHIPPED | OUTPATIENT
Start: 2017-10-03 | End: 2017-10-13

## 2017-10-03 NOTE — PROGRESS NOTES
Subjective:      Chief Complaint   Patient presents with   • Rash     has herpes breakout on groin area               Rash  This is a new problem. The current episode started yesterday. The problem is unchanged. Pain location: groin area. The rash is characterized by pain, redness and blistering. Pt was exposed to nothing. Associated symptoms include fatigue. Pertinent negatives include no congestion, cough or fever. Past treatments include nothing.   He has a hx or recurrent gential herpes.       Social History     Social History   • Marital status: Single     Spouse name: N/A   • Number of children: N/A   • Years of education: N/A     Occupational History   • Not on file.     Social History Main Topics   • Smoking status: Never Smoker   • Smokeless tobacco: Never Used   • Alcohol use No   • Drug use: No   • Sexual activity: Not on file     Other Topics Concern   • Not on file     Social History Narrative    Came from [place] .   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  Likes to travel and go to movie.           Past Medical History:   Diagnosis Date   • Arthritis    • Back pain     5/24/2016 states back pain not an issue at this time   • Bronchitis    • Chickenpox    • Depression    • Diabetes (CMS-[facility])    • HIV (human immunodeficiency virus infection) (CMS-HCC)    • Mumps    • Neuropathy (CMS-HCC)    • Renal disorder     5/4/2016,pt states not aware of any renal disorder   • Sleep apnea     diagnosed with sleep apnea,has cpap,lost 60 lbs,uses cpap sometimes   • Tonsillitis    • Whooping cough          Current Outpatient Prescriptions on File Prior to Visit   Medication Sig Dispense Refill   • Blood Glucose Monitoring Suppl SUPPLIES Misc Ultra one glucometer test strips for blood sugar check 4 times a day 150 Each 6   • levothyroxine (SYNTHROID) 50 MCG Tab Take 1 Tab by mouth Every morning on an empty stomach. 30 Tab 6   • metformin (GLUCOPHAGE) 1000 MG tablet TAKE  "1 TAB BY MOUTH 2 TIMES A DAY, WITH MEALS. 180 Tab 3   • insulin regular (HUMULIN R) 100 Unit/mL Solution Inject 5-30 Units as instructed 3 times a day before meals. Pt states \"I use a sliding scale\".     • emtricitabine-tenofovir (TRUVADA) 200-300 MG per tablet Take 1 Tab by mouth every day. 30 Tab 3   • paroxetine (PAXIL) 20 MG TABS Take 1 Tab by mouth every day. 30 Tab 3   • insulin NPH (NOVOLIN N RELION) 100 UNIT/ML Suspension Inject 50 Units as instructed 2 Times a Day. 30 mL 6   • NOVOLIN R 100 UNIT/ML Solution INJECT 5-15 UNITS AS INSTRUCTED 3 TIMES A DAY BEFORE MEALS. 20 mL 2   • acyclovir (ZOVIRAX) 800 MG Tab Take 1 Tab by mouth 2 times a day. (Patient taking differently: Take 400 mg by mouth 3 times a day.) 10 Tab 0   • sulfamethoxazole-trimethoprim (BACTRIM DS) 800-160 MG tablet Take 1 Tab by mouth every day. 30 Tab 0   • acetaminophen (TYLENOL) 500 MG Tab Take 1,000 mg by mouth every 6 hours as needed.     • Ferrous Sulfate (IRON) 325 (65 FE) MG Tab Take 1 Tab by mouth 3 times a day, with meals. (Patient not taking: Reported on 10/3/2017) 90 Tab 0   • multivitamin (THERAGRAN) Tab Take 2 Tabs by mouth every day. Indications: pt has been taking \"for years\"       No current facility-administered medications on file prior to visit.          Review of Systems   Constitutional: Positive for fatigue. Negative for fever.   HENT: Negative for congestion.    Respiratory: Negative for cough.    Skin: Positive for rash.          Objective:     Blood pressure 106/64, pulse 78, temperature 36.7 °C (98.1 °F), resp. rate 14, weight 90.7 kg (200 lb), SpO2 95 %.    Physical Exam   Constitutional: She is oriented to person, place, and time. She appears well-developed and well-nourished. No distress.   HENT:   Head: Normocephalic and atraumatic.   Mouth/Throat: No oropharyngeal exudate.   Eyes: Conjunctivae are normal. Pupils are equal, round, and reactive to light. No scleral icterus.   Cardiovascular: Normal rate, regular " rhythm and normal heart sounds.    Pulmonary/Chest: Effort normal and breath sounds normal. No respiratory distress. Pt has no wheezes. Pt has no rales.   Neurological: pt is alert and oriented to person, place, and time. No cranial nerve deficit.   Skin: Skin is warm. Rash (vesicular lesions on an erythematous base in a dermatome distribution perineum ) noted. Pt is not diaphoretic.   Nursing note and vitals reviewed.              Assessment/Plan:        1. HSV (herpes simplex virus) anogenital infection     - acyclovir (ZOVIRAX) 800 MG Tab; Take 1 Tab by mouth 5 Times a Day for 10 days.  Dispense: 50 Tab; Refill: 3    Follow up in one week if no improvement, sooner if symptoms worsen.

## 2017-10-06 LAB
HSV1+2 DNA SPEC QL NAA+PROBE: ABNORMAL
SIGNIFICANT IND 70042: ABNORMAL
SITE SITE: ABNORMAL
SOURCE SOURCE: ABNORMAL

## 2017-10-20 ENCOUNTER — ANTICOAGULATION MONITORING (OUTPATIENT)
Dept: VASCULAR LAB | Facility: MEDICAL CENTER | Age: 62
End: 2017-10-20

## 2017-10-20 DIAGNOSIS — G47.33 OSA (OBSTRUCTIVE SLEEP APNEA): ICD-10-CM

## 2017-10-20 DIAGNOSIS — B20 HIV INFECTION (HCC): ICD-10-CM

## 2017-10-20 DIAGNOSIS — I48.91 ATRIAL FIBRILLATION STATUS POST CARDIOVERSION (HCC): ICD-10-CM

## 2017-10-20 NOTE — PROGRESS NOTES
Discharged from [facility].  Pt no longer anticoagulated  Lilian Peña, PharmD

## 2018-03-23 ENCOUNTER — HOSPITAL ENCOUNTER (INPATIENT)
Dept: HOSPITAL 8 - ORIP | Age: 63
LOS: 4 days | Discharge: HOME | DRG: 460 | End: 2018-03-27
Attending: ORTHOPAEDIC SURGERY | Admitting: ORTHOPAEDIC SURGERY
Payer: COMMERCIAL

## 2018-03-23 VITALS — SYSTOLIC BLOOD PRESSURE: 118 MMHG | DIASTOLIC BLOOD PRESSURE: 74 MMHG

## 2018-03-23 VITALS — WEIGHT: 170.2 LBS | HEIGHT: 70 IN | BODY MASS INDEX: 24.37 KG/M2

## 2018-03-23 VITALS — SYSTOLIC BLOOD PRESSURE: 128 MMHG | DIASTOLIC BLOOD PRESSURE: 77 MMHG
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
## 2018-03-23 DIAGNOSIS — E11.9: ICD-10-CM

## 2018-03-23 DIAGNOSIS — T84.226A: Primary | ICD-10-CM

## 2018-03-23 DIAGNOSIS — M96.0: ICD-10-CM

## 2018-03-23 DIAGNOSIS — M85.80: ICD-10-CM

## 2018-03-23 DIAGNOSIS — S32.021A: ICD-10-CM

## 2018-03-23 DIAGNOSIS — Z89.512: ICD-10-CM
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
## 2018-03-23 LAB
ANION GAP SERPL CALC-SCNC: 4 MMOL/L (ref 5–15)
BASOPHILS # BLD AUTO: 0.04 X10^3/UL (ref 0–0.1)
BASOPHILS NFR BLD AUTO: 1 % (ref 0–1)
CALCIUM SERPL-MCNC: 9.4 MG/DL (ref 8.5–10.1)
CHLORIDE SERPL-SCNC: 107 MMOL/L (ref 98–107)
CREAT SERPL-MCNC: 0.75 MG/DL (ref 0.7–1.3)
CULTURE INDICATED?: NO
EOSINOPHIL # BLD AUTO: 0.43 X10^3/UL (ref 0–0.4)
EOSINOPHIL NFR BLD AUTO: 9 % (ref 1–7)
ERYTHROCYTE [DISTWIDTH] IN BLOOD BY AUTOMATED COUNT: 17.5 % (ref 9.4–14.8)
INR PPP: 1.01 (ref 0.93–1.1)
LYMPHOCYTES # BLD AUTO: 2.22 X10^3/UL (ref 1–3.4)
LYMPHOCYTES NFR BLD AUTO: 45 % (ref 22–44)
MCH RBC QN AUTO: 29.6 PG (ref 27.5–34.5)
MCHC RBC AUTO-ENTMCNC: 33.3 G/DL (ref 33.2–36.2)
MCV RBC AUTO: 88.9 FL (ref 81–97)
MD: NO
MICROSCOPIC: (no result)
MONOCYTES # BLD AUTO: 0.56 X10^3/UL (ref 0.2–0.8)
MONOCYTES NFR BLD AUTO: 11 % (ref 2–9)
NEUTROPHILS # BLD AUTO: 1.72 X10^3/UL (ref 1.8–6.8)
NEUTROPHILS NFR BLD AUTO: 35 % (ref 42–75)
PLATELET # BLD AUTO: 368 X10^3/UL (ref 130–400)
PMV BLD AUTO: 6.5 FL (ref 7.4–10.4)
PROTHROMBIN TIME: 10.5 SECONDS (ref 9.6–11.5)
RBC # BLD AUTO: 4.54 X10^6/UL (ref 4.38–5.82)

## 2018-03-23 PROCEDURE — 0RGA071 FUSION OF THORACOLUMBAR VERTEBRAL JOINT WITH AUTOLOGOUS TISSUE SUBSTITUTE, POSTERIOR APPROACH, POSTERIOR COLUMN, OPEN APPROACH: ICD-10-PCS | Performed by: ORTHOPAEDIC SURGERY

## 2018-03-23 PROCEDURE — 86923 COMPATIBILITY TEST ELECTRIC: CPT

## 2018-03-23 PROCEDURE — 0SG1071 FUSION OF 2 OR MORE LUMBAR VERTEBRAL JOINTS WITH AUTOLOGOUS TISSUE SUBSTITUTE, POSTERIOR APPROACH, POSTERIOR COLUMN, OPEN APPROACH: ICD-10-PCS | Performed by: ORTHOPAEDIC SURGERY

## 2018-03-23 PROCEDURE — C9290 INJ, BUPIVACAINE LIPOSOME: HCPCS

## 2018-03-23 PROCEDURE — C1762 CONN TISS, HUMAN(INC FASCIA): HCPCS

## 2018-03-23 PROCEDURE — 80048 BASIC METABOLIC PNL TOTAL CA: CPT

## 2018-03-23 PROCEDURE — 72080 X-RAY EXAM THORACOLMB 2/> VW: CPT

## 2018-03-23 PROCEDURE — 81001 URINALYSIS AUTO W/SCOPE: CPT

## 2018-03-23 PROCEDURE — 30233K1 TRANSFUSION OF NONAUTOLOGOUS FROZEN PLASMA INTO PERIPHERAL VEIN, PERCUTANEOUS APPROACH: ICD-10-PCS | Performed by: ORTHOPAEDIC SURGERY

## 2018-03-23 PROCEDURE — 30233L1 TRANSFUSION OF NONAUTOLOGOUS FRESH PLASMA INTO PERIPHERAL VEIN, PERCUTANEOUS APPROACH: ICD-10-PCS | Performed by: ORTHOPAEDIC SURGERY

## 2018-03-23 PROCEDURE — P9016 RBC LEUKOCYTES REDUCED: HCPCS

## 2018-03-23 PROCEDURE — 0SP004Z REMOVAL OF INTERNAL FIXATION DEVICE FROM LUMBAR VERTEBRAL JOINT, OPEN APPROACH: ICD-10-PCS | Performed by: ORTHOPAEDIC SURGERY

## 2018-03-23 PROCEDURE — 86850 RBC ANTIBODY SCREEN: CPT

## 2018-03-23 PROCEDURE — 85025 COMPLETE CBC W/AUTO DIFF WBC: CPT

## 2018-03-23 PROCEDURE — 4A11X4G MONITORING OF PERIPHERAL NERVOUS ELECTRICAL ACTIVITY, INTRAOPERATIVE, EXTERNAL APPROACH: ICD-10-PCS | Performed by: ORTHOPAEDIC SURGERY

## 2018-03-23 PROCEDURE — 93005 ELECTROCARDIOGRAM TRACING: CPT

## 2018-03-23 PROCEDURE — 36415 COLL VENOUS BLD VENIPUNCTURE: CPT

## 2018-03-23 PROCEDURE — 82962 GLUCOSE BLOOD TEST: CPT

## 2018-03-23 PROCEDURE — 85610 PROTHROMBIN TIME: CPT

## 2018-03-23 PROCEDURE — C1713 ANCHOR/SCREW BN/BN,TIS/BN: HCPCS

## 2018-03-23 PROCEDURE — 86900 BLOOD TYPING SEROLOGIC ABO: CPT

## 2018-03-23 PROCEDURE — 30233N1 TRANSFUSION OF NONAUTOLOGOUS RED BLOOD CELLS INTO PERIPHERAL VEIN, PERCUTANEOUS APPROACH: ICD-10-PCS | Performed by: ORTHOPAEDIC SURGERY

## 2018-03-23 PROCEDURE — P9017 PLASMA 1 DONOR FRZ W/IN 8 HR: HCPCS

## 2018-03-23 RX ADMIN — MORPHINE SULFATE PRN MG: 10 INJECTION INTRAVENOUS at 18:00

## 2018-03-23 RX ADMIN — FENTANYL CITRATE PRN MCG: 50 INJECTION INTRAMUSCULAR; INTRAVENOUS at 18:09

## 2018-03-23 RX ADMIN — MORPHINE SULFATE PRN MG: 10 INJECTION INTRAVENOUS at 18:14

## 2018-03-23 RX ADMIN — FENTANYL CITRATE PRN MCG: 50 INJECTION INTRAMUSCULAR; INTRAVENOUS at 18:18

## 2018-03-23 RX ADMIN — DEXTROSE SCH MLS/HR: 50 INJECTION, SOLUTION INTRAVENOUS at 21:27

## 2018-03-23 RX ADMIN — DEXTROSE, SODIUM CHLORIDE, AND POTASSIUM CHLORIDE SCH MLS/HR: 5; .45; .15 INJECTION INTRAVENOUS at 21:26

## 2018-03-24 VITALS — DIASTOLIC BLOOD PRESSURE: 67 MMHG | SYSTOLIC BLOOD PRESSURE: 122 MMHG

## 2018-03-24 VITALS — DIASTOLIC BLOOD PRESSURE: 62 MMHG | SYSTOLIC BLOOD PRESSURE: 111 MMHG

## 2018-03-24 VITALS — SYSTOLIC BLOOD PRESSURE: 102 MMHG | DIASTOLIC BLOOD PRESSURE: 57 MMHG

## 2018-03-24 VITALS — SYSTOLIC BLOOD PRESSURE: 99 MMHG | DIASTOLIC BLOOD PRESSURE: 50 MMHG

## 2018-03-24 VITALS — DIASTOLIC BLOOD PRESSURE: 55 MMHG | SYSTOLIC BLOOD PRESSURE: 100 MMHG

## 2018-03-24 RX ADMIN — INSULIN HUMAN SCH UNITS: 100 INJECTION, SOLUTION PARENTERAL at 22:07

## 2018-03-24 RX ADMIN — BUDESONIDE AND FORMOTEROL FUMARATE DIHYDRATE SCH TAB: 160; 4.5 AEROSOL RESPIRATORY (INHALATION) at 09:00

## 2018-03-24 RX ADMIN — INSULIN HUMAN SCH UNITS: 100 INJECTION, SOLUTION PARENTERAL at 11:00

## 2018-03-24 RX ADMIN — DEXTROSE, SODIUM CHLORIDE, AND POTASSIUM CHLORIDE SCH MLS/HR: 5; .45; .15 INJECTION INTRAVENOUS at 06:39

## 2018-03-24 RX ADMIN — DEXTROSE SCH MLS/HR: 50 INJECTION, SOLUTION INTRAVENOUS at 06:12

## 2018-03-24 RX ADMIN — DEXTROSE, SODIUM CHLORIDE, AND POTASSIUM CHLORIDE SCH MLS/HR: 5; .45; .15 INJECTION INTRAVENOUS at 20:00

## 2018-03-24 RX ADMIN — DIAZEPAM PRN MG: 5 INJECTION, SOLUTION INTRAMUSCULAR; INTRAVENOUS at 01:34

## 2018-03-24 RX ADMIN — INSULIN HUMAN SCH UNITS: 100 INJECTION, SOLUTION PARENTERAL at 16:00

## 2018-03-24 RX ADMIN — PAROXETINE HYDROCHLORIDE HEMIHYDRATE SCH MG: 20 TABLET, FILM COATED ORAL at 09:00

## 2018-03-25 VITALS — DIASTOLIC BLOOD PRESSURE: 65 MMHG | SYSTOLIC BLOOD PRESSURE: 111 MMHG

## 2018-03-25 VITALS — DIASTOLIC BLOOD PRESSURE: 63 MMHG | SYSTOLIC BLOOD PRESSURE: 116 MMHG

## 2018-03-25 VITALS — DIASTOLIC BLOOD PRESSURE: 65 MMHG | SYSTOLIC BLOOD PRESSURE: 116 MMHG

## 2018-03-25 VITALS — DIASTOLIC BLOOD PRESSURE: 64 MMHG | SYSTOLIC BLOOD PRESSURE: 103 MMHG

## 2018-03-25 VITALS — DIASTOLIC BLOOD PRESSURE: 72 MMHG | SYSTOLIC BLOOD PRESSURE: 123 MMHG

## 2018-03-25 RX ADMIN — INSULIN HUMAN SCH UNITS: 100 INJECTION, SOLUTION PARENTERAL at 21:06

## 2018-03-25 RX ADMIN — INSULIN HUMAN SCH UNITS: 100 INJECTION, SOLUTION PARENTERAL at 12:02

## 2018-03-25 RX ADMIN — BUDESONIDE AND FORMOTEROL FUMARATE DIHYDRATE SCH TAB: 160; 4.5 AEROSOL RESPIRATORY (INHALATION) at 09:50

## 2018-03-25 RX ADMIN — DIAZEPAM PRN MG: 5 INJECTION, SOLUTION INTRAMUSCULAR; INTRAVENOUS at 21:07

## 2018-03-25 RX ADMIN — INSULIN HUMAN SCH UNITS: 100 INJECTION, SOLUTION PARENTERAL at 07:46

## 2018-03-25 RX ADMIN — DEXTROSE, SODIUM CHLORIDE, AND POTASSIUM CHLORIDE SCH MLS/HR: 5; .45; .15 INJECTION INTRAVENOUS at 09:51

## 2018-03-25 RX ADMIN — DEXTROSE, SODIUM CHLORIDE, AND POTASSIUM CHLORIDE SCH MLS/HR: 5; .45; .15 INJECTION INTRAVENOUS at 22:08

## 2018-03-25 RX ADMIN — BUDESONIDE AND FORMOTEROL FUMARATE DIHYDRATE SCH TAB: 160; 4.5 AEROSOL RESPIRATORY (INHALATION) at 09:51

## 2018-03-25 RX ADMIN — INSULIN HUMAN SCH UNITS: 100 INJECTION, SOLUTION PARENTERAL at 16:11

## 2018-03-25 RX ADMIN — PAROXETINE HYDROCHLORIDE HEMIHYDRATE SCH MG: 20 TABLET, FILM COATED ORAL at 09:51

## 2018-03-26 VITALS — SYSTOLIC BLOOD PRESSURE: 103 MMHG | DIASTOLIC BLOOD PRESSURE: 66 MMHG

## 2018-03-26 VITALS — SYSTOLIC BLOOD PRESSURE: 101 MMHG | DIASTOLIC BLOOD PRESSURE: 61 MMHG

## 2018-03-26 VITALS — DIASTOLIC BLOOD PRESSURE: 62 MMHG | SYSTOLIC BLOOD PRESSURE: 106 MMHG

## 2018-03-26 VITALS — DIASTOLIC BLOOD PRESSURE: 63 MMHG | SYSTOLIC BLOOD PRESSURE: 99 MMHG

## 2018-03-26 RX ADMIN — BUDESONIDE AND FORMOTEROL FUMARATE DIHYDRATE SCH TAB: 160; 4.5 AEROSOL RESPIRATORY (INHALATION) at 08:25

## 2018-03-26 RX ADMIN — DOCUSATE SODIUM SCH MG: 100 CAPSULE, LIQUID FILLED ORAL at 08:24

## 2018-03-26 RX ADMIN — PAROXETINE HYDROCHLORIDE HEMIHYDRATE SCH MG: 20 TABLET, FILM COATED ORAL at 08:24

## 2018-03-26 RX ADMIN — INSULIN HUMAN SCH UNITS: 100 INJECTION, SOLUTION PARENTERAL at 16:07

## 2018-03-26 RX ADMIN — INSULIN HUMAN SCH UNITS: 100 INJECTION, SOLUTION PARENTERAL at 07:10

## 2018-03-26 RX ADMIN — POLYETHYLENE GLYCOL 3350 PRN GM: 17 POWDER, FOR SOLUTION ORAL at 12:10

## 2018-03-26 RX ADMIN — INSULIN HUMAN SCH UNITS: 100 INJECTION, SOLUTION PARENTERAL at 12:15

## 2018-03-26 RX ADMIN — DEXTROSE, SODIUM CHLORIDE, AND POTASSIUM CHLORIDE SCH MLS/HR: 5; .45; .15 INJECTION INTRAVENOUS at 12:03

## 2018-03-26 RX ADMIN — DOCUSATE SODIUM SCH MG: 100 CAPSULE, LIQUID FILLED ORAL at 20:30

## 2018-03-26 RX ADMIN — INSULIN HUMAN SCH UNITS: 100 INJECTION, SOLUTION PARENTERAL at 20:30

## 2018-03-27 VITALS — SYSTOLIC BLOOD PRESSURE: 122 MMHG | DIASTOLIC BLOOD PRESSURE: 73 MMHG

## 2018-03-27 VITALS — SYSTOLIC BLOOD PRESSURE: 108 MMHG | DIASTOLIC BLOOD PRESSURE: 63 MMHG

## 2018-03-27 VITALS — DIASTOLIC BLOOD PRESSURE: 75 MMHG | SYSTOLIC BLOOD PRESSURE: 122 MMHG

## 2018-03-27 RX ADMIN — POLYETHYLENE GLYCOL 3350 PRN GM: 17 POWDER, FOR SOLUTION ORAL at 08:14

## 2018-03-27 RX ADMIN — INSULIN HUMAN SCH UNITS: 100 INJECTION, SOLUTION PARENTERAL at 06:32

## 2018-03-27 RX ADMIN — DOCUSATE SODIUM SCH MG: 100 CAPSULE, LIQUID FILLED ORAL at 08:14

## 2018-03-27 RX ADMIN — BUDESONIDE AND FORMOTEROL FUMARATE DIHYDRATE SCH TAB: 160; 4.5 AEROSOL RESPIRATORY (INHALATION) at 08:15

## 2018-03-27 RX ADMIN — DEXTROSE, SODIUM CHLORIDE, AND POTASSIUM CHLORIDE SCH MLS/HR: 5; .45; .15 INJECTION INTRAVENOUS at 12:16

## 2018-03-27 RX ADMIN — DEXTROSE, SODIUM CHLORIDE, AND POTASSIUM CHLORIDE SCH MLS/HR: 5; .45; .15 INJECTION INTRAVENOUS at 01:30

## 2018-03-27 RX ADMIN — PAROXETINE HYDROCHLORIDE HEMIHYDRATE SCH MG: 20 TABLET, FILM COATED ORAL at 08:14

## 2018-03-27 RX ADMIN — INSULIN HUMAN SCH UNITS: 100 INJECTION, SOLUTION PARENTERAL at 11:17

## 2018-03-27 RX ADMIN — BUDESONIDE AND FORMOTEROL FUMARATE DIHYDRATE SCH TAB: 160; 4.5 AEROSOL RESPIRATORY (INHALATION) at 08:14

## 2020-01-15 ENCOUNTER — OFFICE VISIT (OUTPATIENT)
Dept: URGENT CARE | Facility: PHYSICIAN GROUP | Age: 65
End: 2020-01-15
Payer: MEDICARE

## 2020-01-15 VITALS
RESPIRATION RATE: 18 BRPM | DIASTOLIC BLOOD PRESSURE: 40 MMHG | WEIGHT: 208 LBS | HEIGHT: 70 IN | TEMPERATURE: 99.3 F | SYSTOLIC BLOOD PRESSURE: 98 MMHG | HEART RATE: 80 BPM | OXYGEN SATURATION: 92 % | BODY MASS INDEX: 29.78 KG/M2

## 2020-01-15 DIAGNOSIS — I48.19 OTHER PERSISTENT ATRIAL FIBRILLATION (HCC): ICD-10-CM

## 2020-01-15 DIAGNOSIS — E11.9 TYPE 2 DIABETES MELLITUS WITHOUT COMPLICATION, UNSPECIFIED WHETHER LONG TERM INSULIN USE (HCC): ICD-10-CM

## 2020-01-15 DIAGNOSIS — M54.12 RIGHT CERVICAL RADICULOPATHY: ICD-10-CM

## 2020-01-15 DIAGNOSIS — B20 HIV INFECTION, UNSPECIFIED SYMPTOM STATUS (HCC): ICD-10-CM

## 2020-01-15 DIAGNOSIS — R00.1 BRADYCARDIA: ICD-10-CM

## 2020-01-15 DIAGNOSIS — I95.9 HYPOTENSION, UNSPECIFIED HYPOTENSION TYPE: Primary | ICD-10-CM

## 2020-01-15 PROCEDURE — 99215 OFFICE O/P EST HI 40 MIN: CPT | Performed by: PHYSICIAN ASSISTANT

## 2020-01-15 RX ORDER — METHYLPREDNISOLONE 4 MG/1
TABLET ORAL
Qty: 21 TAB | Refills: 0 | Status: SHIPPED | OUTPATIENT
Start: 2020-01-15 | End: 2023-01-16

## 2020-01-15 RX ORDER — CYCLOBENZAPRINE HCL 5 MG
5-10 TABLET ORAL 3 TIMES DAILY PRN
Qty: 30 TAB | Refills: 0 | Status: SHIPPED | OUTPATIENT
Start: 2020-01-15 | End: 2023-01-16

## 2020-01-15 ASSESSMENT — PAIN SCALES - GENERAL: PAINLEVEL: 10=SEVERE PAIN

## 2020-01-16 NOTE — PROGRESS NOTES
Subjective:      Pt is a 64 y.o. male who presents with Arm Pain (x 2 weeks, on and off, constent sharp pain, pain radiates from shoulder to wrist )            HPI  This is a new problem. Pt notes right arm pain x 2 weeks after waking up with it one morning and notes it comes and goes but worse in the last few days with sharp pain radiating down right arm. Pt also notes intense fatigue and weakness lately as well x 2 days. Pt has not taken any Rx medications for this condition. Pt states the pain is a 7/10 of right arm, aching in nature and worse at night. Pt denies CP, SOB, NVD,  headaches, dizziness, change in vision, hives, or other joint pain. The pt's medication list, problem list, and allergies have been evaluated and reviewed during today's visit.    PMH:  Past Medical History:   Diagnosis Date   • Arthritis    • Back pain     5/24/2016 states back pain not an issue at this time   • Bronchitis    • Chickenpox    • Depression    • Diabetes (HCC)    • HIV (human immunodeficiency virus infection) ([facility])    • Mumps    • Neuropathy ([facility])    • Renal disorder     5/4/2016,pt states not aware of any renal disorder   • Sleep apnea     diagnosed with sleep apnea,has cpap,lost 60 lbs,uses cpap sometimes   • Tonsillitis    • Whooping cough        PSH:  Past Surgical History:   Procedure Laterality Date   • IRRIGATION & DEBRIDEMENT ORTHO Left 12/22/2016    Procedure: IRRIGATION & DEBRIDEMENT ORTHO FOOT;  Surgeon: Abhishek Edwrads M.D.;  Location: [facility];  Service:    • HARDWARE REMOVAL ORTHO Left 12/22/2016    Procedure: HARDWARE REMOVAL ORTHO;  Surgeon: Abhishek Edwards M.D.;  Location: [facility];  Service:    • IRRIGATION & DEBRIDEMENT ORTHO Left 7/16/2016    Procedure: IRRIGATION & DEBRIDEMENT ORTHO FOOT WITH WOUND VAC;  Surgeon: Abhishek Edwards M.D.;  Location: [facility];  Service:    • ANKLE ORIF Left 5/26/2016    Procedure: ANKLE ORIF Navicular , talonavicular,  gastrocnemius recession;  Surgeon: Abhishek Edwards M.D.;  Location: SURGERY [facility];  Service:    • ANKLE FUSION Left 2016    Procedure: ANKLE FUSION FOR: navicular cuneiform fusion , allograft ;  Surgeon: Abhishek Edwards M.D.;  Location: SURGERY [facility];  Service:    • OTHER ORTHOPEDIC SURGERY Bilateral     juantia. carpal tunnel release   • OTHER      sinus surgery   • OTHER      tonsillectomy   • OTHER      pilonidal cyst removed   • OTHER ABDOMINAL SURGERY      appendectomy   • OTHER ORTHOPEDIC SURGERY Right     ganglion cyst removed   • APPENDECTOMY     • CARPAL TUNNEL RELEASE     • OTHER ORTHOPEDIC SURGERY      bilateral heel spurs removed   • SINUSCOPE         Fam Hx:    family history includes Diabetes in his father and mother; Heart Attack in his mother; Heart Disease in his father.  Family Status   Relation Name Status   • Mo     • Fa         Soc HX:  Social History     Socioeconomic History   • Marital status: Single     Spouse name: Not on file   • Number of children: Not on file   • Years of education: Not on file   • Highest education level: Not on file   Occupational History   • Not on file   Social Needs   • Financial resource strain: Not on file   • Food insecurity:     Worry: Not on file     Inability: Not on file   • Transportation needs:     Medical: Not on file     Non-medical: Not on file   Tobacco Use   • Smoking status: Never Smoker   • Smokeless tobacco: Never Used   Substance and Sexual Activity   • Alcohol use: No     Alcohol/week: 0.0 oz   • Drug use: No   • Sexual activity: Not on file   Lifestyle   • Physical activity:     Days per week: Not on file     Minutes per session: Not on file   • Stress: Not on file   Relationships   • Social connections:     Talks on phone: Not on file     Gets together: Not on file     Attends [employer]al service: Not on file     Active member of club or organization: Not on file     Attends meetings  "of clubs or organizations: Not on file     Relationship status: Not on file   • Intimate partner violence:     Fear of current or ex partner: Not on file     Emotionally abused: Not on file     Physically abused: Not on file     Forced sexual activity: Not on file   Other Topics Concern   • Not on file   Social History Narrative    C[place] .   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  Likes to travel and go to movie.           Medications:    Current Outpatient Medications:   •  methylPREDNISolone (MEDROL DOSEPAK) 4 MG Tablet Therapy Pack, Follow schedule on package instructions., Disp: 21 Tab, Rfl: 0  •  cyclobenzaprine (FLEXERIL) 5 MG tablet, Take 1-2 Tabs by mouth 3 times a day as needed., Disp: 30 Tab, Rfl: 0  •  insulin NPH (NOVOLIN N RELION) 100 UNIT/ML Suspension, Inject 50 Units as instructed 2 Times a Day., Disp: 30 mL, Rfl: 6  •  NOVOLIN R 100 UNIT/ML Solution, INJECT 5-15 UNITS AS INSTRUCTED 3 TIMES A DAY BEFORE MEALS., Disp: 20 mL, Rfl: 2  •  metformin (GLUCOPHAGE) 1000 MG tablet, TAKE 1 TAB BY MOUTH 2 TIMES A DAY, WITH MEALS., Disp: 180 Tab, Rfl: 3  •  acetaminophen (TYLENOL) 500 MG Tab, Take 1,000 mg by mouth every 6 hours as needed., Disp: , Rfl:   •  emtricitabine-tenofovir (TRUVADA) 200-300 MG per tablet, Take 1 Tab by mouth every day., Disp: 30 Tab, Rfl: 3  •  multivitamin (THERAGRAN) Tab, Take 2 Tabs by mouth every day. Indications: pt has been taking \"for years\", Disp: , Rfl:   •  paroxetine (PAXIL) 20 MG TABS, Take 1 Tab by mouth every day., Disp: 30 Tab, Rfl: 3  •  Blood Glucose Monitoring Suppl SUPPLIES Misc, Ultra one glucometer test strips for blood sugar check 4 times a day, Disp: 150 Each, Rfl: 6      Allergies:  Oxycodone    ROS    Constitutional: Negative for fever, chills and +malaise/fatigue.   HENT: Negative for congestion and sore throat.    Eyes: Negative for blurred vision, double vision and photophobia.   Respiratory: " "Negative for cough and shortness of breath.  Cardiovascular: Negative for chest pain and palpitations.   Gastrointestinal: Negative for heartburn, nausea, vomiting, abdominal pain, diarrhea and constipation.   Genitourinary: Negative for dysuria and flank pain.   Musculoskeletal: POS for right arm joint pain and myalgias.   Skin: Negative for itching and rash.   Neurological: Negative for dizziness, tingling and headaches.   Endo/Heme/Allergies: Does not bruise/bleed easily.   Psychiatric/Behavioral: Negative for depression. The patient is not nervous/anxious.         Objective:     BP (!) 98/40   Pulse 80   Temp 37.4 °C (99.3 °F) (Temporal)   Resp 18   Ht 1.778 m (5' 10\")   Wt 94.3 kg (208 lb)   SpO2 92%   BMI 29.84 kg/m²      Physical Exam  Musculoskeletal:      Right shoulder: He exhibits decreased range of motion, tenderness, pain, spasm and decreased strength. He exhibits no bony tenderness, no swelling, no effusion, no crepitus, no deformity, no laceration and normal pulse.        Arms:            Constitutional: PT is oriented to person, place, and time. PT appears lethargic and listless  HENT:   Head: Normocephalic and atraumatic.   Mouth/Throat: Oropharynx is clear and moist. No oropharyngeal exudate.   Eyes: Conjunctivae normal and EOM are normal. Pupils are equal, round, and reactive to light.   Neck: Normal range of motion. Neck supple. No thyromegaly present.   Cardiovascular: Normal rate, regular rhythm, normal heart sounds and intact distal pulses.  Exam reveals no gallop and no friction rub.    No murmur heard.  Pulmonary/Chest: Effort normal and breath sounds normal. No respiratory distress. PT has no wheezes. PT has no rales. Pt exhibits no tenderness.   Abdominal: Soft. Bowel sounds are normal. PT exhibits no distension and no mass. There is no tenderness. There is no rebound and no guarding.   Neurological: PT is alert and oriented to person, place, and time. PT has normal reflexes. No " cranial nerve deficit.   Skin: Skin is warm and dry. No rash noted. PT is not diaphoretic. No erythema.       Psychiatric: PT has a normal mood and affect. PT behavior is normal. Judgment and thought content normal.          Assessment/Plan:       1. Hypotension, unspecified hypotension type      2. Bradycardia      3. Right cervical radiculopathy    - methylPREDNISolone (MEDROL DOSEPAK) 4 MG Tablet Therapy Pack; Follow schedule on package instructions.  Dispense: 21 Tab; Refill: 0  - cyclobenzaprine (FLEXERIL) 5 MG tablet; Take 1-2 Tabs by mouth 3 times a day as needed.  Dispense: 30 Tab; Refill: 0  - REFERRAL TO SPORTS MEDICINE    4. AFIB- history of  5. HIV  6. DM2    BP 98/40, pulse 53-80  Pt to go to [facility] for work up on weakness, bradycardia and hypotension  Rest, fluids encouraged.  AVS with medical info given.  Pt was in full understanding and agreement with the plan.  Differential diagnosis, natural history, supportive care, and indications for immediate follow-up discussed. All questions answered. Patient agrees with the plan of care.  Follow-up as needed if symptoms worsen or fail to improve to PCP, Urgent care or Emergency Room.

## 2020-01-16 NOTE — PATIENT INSTRUCTIONS
Hypotension  As your heart beats, it forces blood through your body. This force is called blood pressure. If you have hypotension, you have low blood pressure. When your blood pressure is too low, you may not get enough blood to your brain. You may feel weak, feel light-headed, have a fast heartbeat, or even pass out (faint).  Follow these instructions at home:  Eating and drinking  · Drink enough fluids to keep your pee (urine) clear or pale yellow.  · Eat a healthy diet, and follow instructions from your doctor about eating or drinking restrictions. A healthy diet includes:  ¨ Fresh fruits and vegetables.  ¨ Whole grains.  ¨ Low-fat (lean) meats.  ¨ Low-fat dairy products.  · Eat extra salt only as told. Do not add extra salt to your diet unless your doctor tells you to.  · Eat small meals often.  · Avoid standing up quickly after you eat.  Medicines  · Take over-the-counter and prescription medicines only as told by your doctor.  ¨ Follow instructions from your doctor about changing how much you take (the dosage) of your medicines, if this applies.  ¨ Do not stop or change your medicine on your own.  General instructions  · Wear compression stockings as told by your doctor.  · Get up slowly from lying down or sitting.  · Avoid hot showers and a lot of heat as told by your doctor.  · Return to your normal activities as told by your doctor. Ask what activities are safe for you.  · Do not use any products that contain nicotine or tobacco, such as cigarettes and e-cigarettes. If you need help quitting, ask your doctor.  · Keep all follow-up visits as told by your doctor. This is important.  Contact a doctor if:  · You throw up (vomit).  · You have watery poop (diarrhea).  · You have a fever for more than 2-3 days.  · You feel more thirsty than normal.  · You feel weak and tired.  Get help right away if:  · You have chest pain.  · You have a fast or irregular heartbeat.  · You lose feeling (get numbness) in any part  of your body.  · You cannot move your arms or your legs.  · You have trouble talking.  · You get sweaty or feel light-headed.  · You faint.  · You have trouble breathing.  · You have trouble staying awake.  · You feel confused.  This information is not intended to replace advice given to you by your health care provider. Make sure you discuss any questions you have with your health care provider.  Document Released: 03/14/2011 Document Revised: 09/05/2017 Document Reviewed: 09/05/2017  Elsevier Interactive Patient Education © 2017 Elsevier Inc[employer]

## 2022-07-20 NOTE — WOUND TEAM
I called patient's mobile number listed and did get a hold of him. Patient stated that he is still in the hospital and is going to have surgery on Tuesday and asked me to cancel his upcoming appointments. I informed patient I would cancel his appointments and to call us as early as possible if he does think he will need follow-up wound care. Patient confirmed.   
Patient is still admitted as an inpatient after his surgery at [facility]. I spoke with Claudia about scheduling since patient will need to be a new eval. and a new referral will also need to be received before we can treat or know what kind of treatment patient will need.   
none

## 2023-01-04 NOTE — H&P (VIEW-ONLY)
Subjective   Patient ID:  Marco Ohara is a 67 y.o. male.    Chief Complaint:  Wound Check of the Left Foot    Last Surgery: Irrigation & Debridement Ortho Foot - Left and Hardware Removal Ortho - Left on 12/22/2016    HPI Marco is here for a wound check.  He is a new wound that is opened up on the medial aspect of his right second toe.  He has known hallux valgus interphalangeus with interphalangeal arthritis and we had planned a corrective procedure for this winter.  He has been working quite a bit.  He is not on any antibiotics.    Review of Systems negative    Objective   Ortho Exam  Alert and oriented no apparent distress.  Chest breathing comfortably, heart regular rate and rhythm.  Hallux valgus interphalangeus on the right.  He has an macerated ulcer to subcutaneous tissue on the medial aspect of his second toe.  He has a large callus dorsally that I have debrided back to healthy tissue today.  I removed a macerated flap of skin to reveal an approximately 1 cm x 6 mm ulcer that does not track to bone.  I do not get any purulent drainage.  No sensation in his foot.  Palpable dorsalis pedis pulse.    Last Imaging Result(s):   None today.  Previous x-rays show hallux valgus interphalangeus with degenerative change of the interphalangeal joint.    Assessment & Plan   Encounter Diagnoses:   Diabetic ulcer of left fifth toe (HCC)    Orders Placed This Encounter    Surgical Case Request: ARTHROPLASTY, TOE    Casting    amoxicillin-clavulanate (AUGMENTIN) 875-125 MG Tab     Marco is a pleasant 67-year-old diabetic gentleman with a new ulcer on his second toe.  I think this forces us to proceed with a corrective procedure of the great toe which is clearly eroding the second.  He is indicated for irrigation debridement of the second toe with minimally invasive Derian and interphalangeal fusion of the great toe.  I would like to put him on prophylactic antibiotics now we will start Augmentin.  He is traveling to  [place] this week and we will get him done just as soon as he returns.  Return for Post-Op, Imaging.

## 2023-01-05 PROBLEM — L97.514: Status: ACTIVE | Noted: 2023-01-05

## 2023-01-16 ENCOUNTER — PRE-ADMISSION TESTING (OUTPATIENT)
Dept: ADMISSIONS | Facility: MEDICAL CENTER | Age: 68
End: 2023-01-16
Attending: ORTHOPAEDIC SURGERY
Payer: COMMERCIAL

## 2023-01-16 RX ORDER — ETRAVIRINE 200 MG/1
20 TABLET ORAL 2 TIMES DAILY
COMMUNITY
Start: 2017-05-25

## 2023-01-16 RX ORDER — LOSARTAN POTASSIUM 25 MG/1
25 TABLET ORAL
COMMUNITY
Start: 2022-11-18

## 2023-01-16 RX ORDER — EMTRICITABINE AND TENOFOVIR ALAFENAMIDE 200; 25 MG/1; MG/1
1 TABLET ORAL DAILY
COMMUNITY
Start: 2023-01-04

## 2023-01-16 RX ORDER — PREGABALIN 150 MG/1
150 CAPSULE ORAL 2 TIMES DAILY
COMMUNITY

## 2023-01-16 RX ORDER — ROSUVASTATIN CALCIUM 5 MG/1
5 TABLET, COATED ORAL
COMMUNITY
Start: 2022-12-23

## 2023-01-16 RX ORDER — DARUNAVIR ETHANOLATE AND COBICISTAT 800; 150 MG/1; MG/1
1 TABLET, FILM COATED ORAL
COMMUNITY
Start: 2023-01-04

## 2023-01-16 NOTE — OR NURSING
Pre admit complete 1/16/2023.  Surgery date 1/17/2023.  Patient unable to come for pre testing labs today because he is working.

## 2023-01-17 ENCOUNTER — APPOINTMENT (OUTPATIENT)
Dept: RADIOLOGY | Facility: MEDICAL CENTER | Age: 68
End: 2023-01-17
Attending: ORTHOPAEDIC SURGERY
Payer: COMMERCIAL

## 2023-01-17 ENCOUNTER — ANESTHESIA (OUTPATIENT)
Dept: SURGERY | Facility: MEDICAL CENTER | Age: 68
End: 2023-01-17
Payer: COMMERCIAL

## 2023-01-17 ENCOUNTER — ANESTHESIA EVENT (OUTPATIENT)
Dept: SURGERY | Facility: MEDICAL CENTER | Age: 68
End: 2023-01-17
Payer: COMMERCIAL

## 2023-01-17 ENCOUNTER — HOSPITAL ENCOUNTER (OUTPATIENT)
Facility: MEDICAL CENTER | Age: 68
Setting detail: OUTPATIENT SURGERY
End: 2023-01-17
Attending: ORTHOPAEDIC SURGERY | Admitting: ORTHOPAEDIC SURGERY
Payer: COMMERCIAL

## 2023-01-17 VITALS
WEIGHT: 199.08 LBS | DIASTOLIC BLOOD PRESSURE: 72 MMHG | HEIGHT: 70 IN | RESPIRATION RATE: 14 BRPM | OXYGEN SATURATION: 96 % | HEART RATE: 78 BPM | SYSTOLIC BLOOD PRESSURE: 151 MMHG | TEMPERATURE: 97.1 F | BODY MASS INDEX: 28.5 KG/M2

## 2023-01-17 DIAGNOSIS — L97.514 TOE ULCER, RIGHT, WITH NECROSIS OF BONE (HCC): ICD-10-CM

## 2023-01-17 LAB
ANION GAP SERPL CALC-SCNC: 9 MMOL/L (ref 7–16)
BUN SERPL-MCNC: 15 MG/DL (ref 8–22)
CALCIUM SERPL-MCNC: 9.4 MG/DL (ref 8.5–10.5)
CHLORIDE SERPL-SCNC: 107 MMOL/L (ref 96–112)
CO2 SERPL-SCNC: 25 MMOL/L (ref 20–33)
CREAT SERPL-MCNC: 0.7 MG/DL (ref 0.5–1.4)
EKG IMPRESSION: NORMAL
GFR SERPLBLD CREATININE-BSD FMLA CKD-EPI: 101 ML/MIN/1.73 M 2
GLUCOSE BLD STRIP.AUTO-MCNC: 155 MG/DL (ref 65–99)
GLUCOSE BLD STRIP.AUTO-MCNC: 157 MG/DL (ref 65–99)
GLUCOSE SERPL-MCNC: 143 MG/DL (ref 65–99)
POTASSIUM SERPL-SCNC: 4.7 MMOL/L (ref 3.6–5.5)
SODIUM SERPL-SCNC: 141 MMOL/L (ref 135–145)

## 2023-01-17 PROCEDURE — 28003 TREATMENT OF FOOT INFECTION: CPT | Mod: ASROC,RT | Performed by: NURSE PRACTITIONER

## 2023-01-17 PROCEDURE — 160046 HCHG PACU - 1ST 60 MINS PHASE II: Performed by: ORTHOPAEDIC SURGERY

## 2023-01-17 PROCEDURE — 700101 HCHG RX REV CODE 250: Performed by: ORTHOPAEDIC SURGERY

## 2023-01-17 PROCEDURE — 82962 GLUCOSE BLOOD TEST: CPT

## 2023-01-17 PROCEDURE — 700105 HCHG RX REV CODE 258: Performed by: ORTHOPAEDIC SURGERY

## 2023-01-17 PROCEDURE — 700101 HCHG RX REV CODE 250: Performed by: STUDENT IN AN ORGANIZED HEALTH CARE EDUCATION/TRAINING PROGRAM

## 2023-01-17 PROCEDURE — A9270 NON-COVERED ITEM OR SERVICE: HCPCS | Performed by: STUDENT IN AN ORGANIZED HEALTH CARE EDUCATION/TRAINING PROGRAM

## 2023-01-17 PROCEDURE — 160029 HCHG SURGERY MINUTES - 1ST 30 MINS LEVEL 4: Performed by: ORTHOPAEDIC SURGERY

## 2023-01-17 PROCEDURE — 28003 TREATMENT OF FOOT INFECTION: CPT | Mod: RT | Performed by: ORTHOPAEDIC SURGERY

## 2023-01-17 PROCEDURE — C1713 ANCHOR/SCREW BN/BN,TIS/BN: HCPCS | Performed by: ORTHOPAEDIC SURGERY

## 2023-01-17 PROCEDURE — 160035 HCHG PACU - 1ST 60 MINS PHASE I: Performed by: ORTHOPAEDIC SURGERY

## 2023-01-17 PROCEDURE — 93005 ELECTROCARDIOGRAM TRACING: CPT | Performed by: ORTHOPAEDIC SURGERY

## 2023-01-17 PROCEDURE — 160025 RECOVERY II MINUTES (STATS): Performed by: ORTHOPAEDIC SURGERY

## 2023-01-17 PROCEDURE — 28755 FUSION OF BIG TOE JOINT: CPT | Mod: ASROC | Performed by: NURSE PRACTITIONER

## 2023-01-17 PROCEDURE — 700111 HCHG RX REV CODE 636 W/ 250 OVERRIDE (IP): Performed by: STUDENT IN AN ORGANIZED HEALTH CARE EDUCATION/TRAINING PROGRAM

## 2023-01-17 PROCEDURE — 28298 COR HLX VLGS PRX PHLX OSTEOT: CPT | Mod: ASROC | Performed by: NURSE PRACTITIONER

## 2023-01-17 PROCEDURE — 700102 HCHG RX REV CODE 250 W/ 637 OVERRIDE(OP): Performed by: STUDENT IN AN ORGANIZED HEALTH CARE EDUCATION/TRAINING PROGRAM

## 2023-01-17 PROCEDURE — 36415 COLL VENOUS BLD VENIPUNCTURE: CPT

## 2023-01-17 PROCEDURE — 28755 FUSION OF BIG TOE JOINT: CPT | Performed by: ORTHOPAEDIC SURGERY

## 2023-01-17 PROCEDURE — 160048 HCHG OR STATISTICAL LEVEL 1-5: Performed by: ORTHOPAEDIC SURGERY

## 2023-01-17 PROCEDURE — 160041 HCHG SURGERY MINUTES - EA ADDL 1 MIN LEVEL 4: Performed by: ORTHOPAEDIC SURGERY

## 2023-01-17 PROCEDURE — 160009 HCHG ANES TIME/MIN: Performed by: ORTHOPAEDIC SURGERY

## 2023-01-17 PROCEDURE — 93010 ELECTROCARDIOGRAM REPORT: CPT | Performed by: INTERNAL MEDICINE

## 2023-01-17 PROCEDURE — 80048 BASIC METABOLIC PNL TOTAL CA: CPT

## 2023-01-17 PROCEDURE — 502240 HCHG MISC OR SUPPLY RC 0272: Performed by: ORTHOPAEDIC SURGERY

## 2023-01-17 PROCEDURE — 01480 ANES OPEN PX LOWER L/A/F NOS: CPT | Performed by: STUDENT IN AN ORGANIZED HEALTH CARE EDUCATION/TRAINING PROGRAM

## 2023-01-17 PROCEDURE — 73630 X-RAY EXAM OF FOOT: CPT | Mod: RT

## 2023-01-17 PROCEDURE — 28298 COR HLX VLGS PRX PHLX OSTEOT: CPT | Performed by: ORTHOPAEDIC SURGERY

## 2023-01-17 PROCEDURE — 160002 HCHG RECOVERY MINUTES (STAT): Performed by: ORTHOPAEDIC SURGERY

## 2023-01-17 DEVICE — IMPLANTABLE DEVICE: Type: IMPLANTABLE DEVICE | Site: FOOT | Status: FUNCTIONAL

## 2023-01-17 RX ORDER — LIDOCAINE HYDROCHLORIDE 10 MG/ML
INJECTION, SOLUTION EPIDURAL; INFILTRATION; INTRACAUDAL; PERINEURAL PRN
Status: DISCONTINUED | OUTPATIENT
Start: 2023-01-17 | End: 2023-01-17 | Stop reason: SURG

## 2023-01-17 RX ORDER — HALOPERIDOL 5 MG/ML
1 INJECTION INTRAMUSCULAR
Status: DISCONTINUED | OUTPATIENT
Start: 2023-01-17 | End: 2023-01-17 | Stop reason: HOSPADM

## 2023-01-17 RX ORDER — CELECOXIB 200 MG/1
400 CAPSULE ORAL ONCE
Status: COMPLETED | OUTPATIENT
Start: 2023-01-17 | End: 2023-01-17

## 2023-01-17 RX ORDER — CEFAZOLIN SODIUM 1 G/3ML
INJECTION, POWDER, FOR SOLUTION INTRAMUSCULAR; INTRAVENOUS PRN
Status: DISCONTINUED | OUTPATIENT
Start: 2023-01-17 | End: 2023-01-17 | Stop reason: SURG

## 2023-01-17 RX ORDER — SODIUM CHLORIDE, SODIUM LACTATE, POTASSIUM CHLORIDE, CALCIUM CHLORIDE 600; 310; 30; 20 MG/100ML; MG/100ML; MG/100ML; MG/100ML
INJECTION, SOLUTION INTRAVENOUS CONTINUOUS
Status: DISCONTINUED | OUTPATIENT
Start: 2023-01-17 | End: 2023-01-17 | Stop reason: HOSPADM

## 2023-01-17 RX ORDER — DIPHENHYDRAMINE HYDROCHLORIDE 50 MG/ML
12.5 INJECTION INTRAMUSCULAR; INTRAVENOUS
Status: DISCONTINUED | OUTPATIENT
Start: 2023-01-17 | End: 2023-01-17 | Stop reason: HOSPADM

## 2023-01-17 RX ORDER — HYDROMORPHONE HYDROCHLORIDE 1 MG/ML
0.2 INJECTION, SOLUTION INTRAMUSCULAR; INTRAVENOUS; SUBCUTANEOUS
Status: DISCONTINUED | OUTPATIENT
Start: 2023-01-17 | End: 2023-01-17 | Stop reason: HOSPADM

## 2023-01-17 RX ORDER — CEFAZOLIN SODIUM 1 G/3ML
2 INJECTION, POWDER, FOR SOLUTION INTRAMUSCULAR; INTRAVENOUS ONCE
Status: DISCONTINUED | OUTPATIENT
Start: 2023-01-17 | End: 2023-01-17 | Stop reason: HOSPADM

## 2023-01-17 RX ORDER — HYDRALAZINE HYDROCHLORIDE 20 MG/ML
5 INJECTION INTRAMUSCULAR; INTRAVENOUS
Status: DISCONTINUED | OUTPATIENT
Start: 2023-01-17 | End: 2023-01-17 | Stop reason: HOSPADM

## 2023-01-17 RX ORDER — HYDROMORPHONE HYDROCHLORIDE 1 MG/ML
0.4 INJECTION, SOLUTION INTRAMUSCULAR; INTRAVENOUS; SUBCUTANEOUS
Status: DISCONTINUED | OUTPATIENT
Start: 2023-01-17 | End: 2023-01-17 | Stop reason: HOSPADM

## 2023-01-17 RX ORDER — DEXAMETHASONE SODIUM PHOSPHATE 4 MG/ML
INJECTION, SOLUTION INTRA-ARTICULAR; INTRALESIONAL; INTRAMUSCULAR; INTRAVENOUS; SOFT TISSUE PRN
Status: DISCONTINUED | OUTPATIENT
Start: 2023-01-17 | End: 2023-01-17 | Stop reason: SURG

## 2023-01-17 RX ORDER — HYDROMORPHONE HYDROCHLORIDE 1 MG/ML
0.1 INJECTION, SOLUTION INTRAMUSCULAR; INTRAVENOUS; SUBCUTANEOUS
Status: DISCONTINUED | OUTPATIENT
Start: 2023-01-17 | End: 2023-01-17 | Stop reason: HOSPADM

## 2023-01-17 RX ORDER — ONDANSETRON 2 MG/ML
INJECTION INTRAMUSCULAR; INTRAVENOUS PRN
Status: DISCONTINUED | OUTPATIENT
Start: 2023-01-17 | End: 2023-01-17 | Stop reason: SURG

## 2023-01-17 RX ORDER — BUPIVACAINE HYDROCHLORIDE 5 MG/ML
INJECTION, SOLUTION EPIDURAL; INTRACAUDAL
Status: DISCONTINUED | OUTPATIENT
Start: 2023-01-17 | End: 2023-01-17 | Stop reason: HOSPADM

## 2023-01-17 RX ORDER — ONDANSETRON 2 MG/ML
4 INJECTION INTRAMUSCULAR; INTRAVENOUS
Status: DISCONTINUED | OUTPATIENT
Start: 2023-01-17 | End: 2023-01-17 | Stop reason: HOSPADM

## 2023-01-17 RX ORDER — ACETAMINOPHEN 500 MG
1000 TABLET ORAL ONCE
Status: COMPLETED | OUTPATIENT
Start: 2023-01-17 | End: 2023-01-17

## 2023-01-17 RX ADMIN — FENTANYL CITRATE 75 MCG: 50 INJECTION, SOLUTION INTRAMUSCULAR; INTRAVENOUS at 08:30

## 2023-01-17 RX ADMIN — SODIUM CHLORIDE, POTASSIUM CHLORIDE, SODIUM LACTATE AND CALCIUM CHLORIDE: 600; 310; 30; 20 INJECTION, SOLUTION INTRAVENOUS at 07:42

## 2023-01-17 RX ADMIN — EPHEDRINE SULFATE 10 MG: 50 INJECTION, SOLUTION INTRAVENOUS at 08:24

## 2023-01-17 RX ADMIN — SODIUM CHLORIDE, POTASSIUM CHLORIDE, SODIUM LACTATE AND CALCIUM CHLORIDE: 600; 310; 30; 20 INJECTION, SOLUTION INTRAVENOUS at 08:16

## 2023-01-17 RX ADMIN — EPHEDRINE SULFATE 20 MG: 50 INJECTION, SOLUTION INTRAVENOUS at 08:35

## 2023-01-17 RX ADMIN — LIDOCAINE HYDROCHLORIDE 40 MG: 10 INJECTION, SOLUTION EPIDURAL; INFILTRATION; INTRACAUDAL; PERINEURAL at 08:20

## 2023-01-17 RX ADMIN — CEFAZOLIN 2 G: 330 INJECTION, POWDER, FOR SOLUTION INTRAMUSCULAR; INTRAVENOUS at 08:22

## 2023-01-17 RX ADMIN — FENTANYL CITRATE 75 MCG: 50 INJECTION, SOLUTION INTRAMUSCULAR; INTRAVENOUS at 08:20

## 2023-01-17 RX ADMIN — EPHEDRINE SULFATE 10 MG: 50 INJECTION, SOLUTION INTRAVENOUS at 08:30

## 2023-01-17 RX ADMIN — PROPOFOL 150 MG: 10 INJECTION, EMULSION INTRAVENOUS at 08:20

## 2023-01-17 RX ADMIN — CELECOXIB 400 MG: 200 CAPSULE ORAL at 07:43

## 2023-01-17 RX ADMIN — ACETAMINOPHEN 1000 MG: 500 TABLET ORAL at 07:43

## 2023-01-17 RX ADMIN — DEXAMETHASONE SODIUM PHOSPHATE 4 MG: 4 INJECTION, SOLUTION INTRA-ARTICULAR; INTRALESIONAL; INTRAMUSCULAR; INTRAVENOUS; SOFT TISSUE at 08:22

## 2023-01-17 RX ADMIN — ONDANSETRON 4 MG: 2 INJECTION INTRAMUSCULAR; INTRAVENOUS at 08:45

## 2023-01-17 ASSESSMENT — PAIN SCALES - GENERAL: PAIN_LEVEL: 2

## 2023-01-17 NOTE — LETTER
January 5, 2023    Patient Name: Edward Ohara  Surgeon Name: Abhishek Edwards M.D.  Surgery Facility: [facility] ([address])  Surgery Date: 1/17/2023    The time of your surgery is not final and may change up to and until the day of your surgery. You will be contacted 24-48 hours prior to your surgery date with your check-in and surgery time.    If you will not be at one of the below numbers please call the surgery scheduler at [phone]  Preferred Phone: [phone]    BEFORE YOUR SURGERY   Pre Registration and/or Lab Work must be done within and no earlier than 28 days prior to your surgery date. Please call [facility] at [phone] for an appointment as soon as possible.    COVID testing is not required for non-symptomatic vaccinated patients with proof of vaccination at [facility].  For un-vaccinated patients please refer to the following instructions for your COVID testing requirements:    COVID test required 4-7 days prior to surgery, failure to do so can result in a cancellation.    The following locations offer COVID testing:    Approved facilities for COVID testing, if scheduled at [facility]:  · [facility] from 7:30am-3:30pm at [address], [place]  · [facility] [phone] (Please call for an appointment)  · Your local pharmacy    Not scheduled at [facility] contact the scheduled facility for approved testing facilities.    Pre op Appointment:    Instructions: Bring a list of all medications you are taking including the dosing and frequency.    DAY OF YOUR SURGERY  Nothing to eat or drink after midnight     Refrain from smoking any substance after midnight prior to surgery. Smoking may interfere with the anesthetic and frequently produces nausea during the recovery period.    Continue taking all lifesaving medications. Including the morning of your surgery with small sip of water.    Please do NOT  take on the day of surgery:  Diuretics: examples- furosemide (Lasix), spironolactone, hydrochlorothiazide  ACE-inhibitors: examples- lisinopril, ramipril, enalapril  “ARBs”: examples- losartan, Olmesartan, valsartan    Please arrive at the hospital/surgery center at the check-in time provided.     An adult will need to bring you and take you home after your surgery.     AFTER YOUR SURGERY  Post op Appointment:   Date: 01/30/23   Time: 09:15AM   With: Abhishek Edwards M.D.   Location: [address] [place] [address]    TIME OFF WORK  FMLA or Disability forms can be faxed directly to: [phone] or you may drop them off at [facility] [place] [address]. Our office charges a $35.00 fee per form. Forms will be completed within 10 business days of drop off and payment received. For the status of your forms you may contact our disability office directly [phone].    MEDICATION INSTRUCTIONS **Please read section completely**    The following medications should be stopped a minimum of 10 days prior to surgery:  All over the counter, Supplements & Herbal medications    Anorectics: Phentermine (Adipex-P, Lomaira and Suprenza), Phentermine-topiramate (Qsymia), Bupropion-naltrexone (Contrave)    Opiod Partial Agonists/Opioid Antagonists: Buprenorphine (Subocone, Belbuca, Butrans, Probuphine Implant, Sublocade), Naltrexone (ReVia, Vivitrol), Naloxone    Amphetamines: Dextroamphetamine/Amphetamine (Adderall, Mydayis), Methylphenidate Hydrochloride (Concerta, Metadate, Methylin, Ritalin)    The following medications should be stopped 5 days prior to surgery:  Blood Thinners: Any Aspirin, Aspirin products, anti-inflammatories such as ibuprofen and any blood thinners such as Coumadin and Plavix. Please consult your prescribing physician if you are on life saving blood thinners, in regards to when to stop medications prior to surgery.     The following medications should be stopped a minimum of 3 days prior to  surgery:  PDE-5 inhibitors: Sildenafil (Viagra), Tadalafil (Cialis), Vardenafil (Levitra), Avanafil (Stendra)    MAO Inhibitors: Rasagiline (Azilect), Selegiline (Eldepryl, Emsam, Selapar), Isocarboxazid (Marplan), Phenelzine (Nardil)

## 2023-01-17 NOTE — PROGRESS NOTES
Patient complaints and symptoms are on the RIGHT foot as in the body of my note.  Surgery to be performed on the RIGHT lower extremity.  Patient is also s/p LEFT below knee amputation.    Abhishek Edwards MD

## 2023-01-17 NOTE — OR NURSING
Patient AAOx4, calm, denies pain and nausea post op. Patient states he has baseline neuropathy in right foot and declining any pain medication. VSS, afebrile, room air 99% breathing even and unlabored. Tolerating water, no nausea. Right foot surgical dressing CDI, post op shoe in place. Patients significant other updated on status and plan of care.

## 2023-01-17 NOTE — ANESTHESIA POSTPROCEDURE EVALUATION
Patient: Edward Ohara    Procedure Summary     Date: 01/17/23 Room / Location: [place] [address] / SURGERY [facility]    Anesthesia Start: 0816 Anesthesia Stop: 0856    Procedures:       RIGHT MINIMALLY INVASIVE AKIN, INTERPHALANGEAL FUSION, IRRIGATION AND DEBRIDEMENT GREAT TOE (Right: Toe)      FUSION, JOINT, TOE (Right: Toe)      DEEP INCISION AND DRAINAGE,FOOT,MULTIPLE AREAS (Right: Foot) Diagnosis:       Toe ulcer, right, with necrosis of bone (HCC)      (Toe ulcer, right, with necrosis of bone (HCC) [L97.514])    Surgeons: Abhishek Edwards M.D. Responsible Provider: Beto Abdullahi D.O.    Anesthesia Type: general ASA Status: 3          Final Anesthesia Type: general  Last vitals  BP   Blood Pressure : 121/65    Temp   36.6 °C (97.9 °F)    Pulse   62   Resp   16    SpO2   94 %      Anesthesia Post Evaluation    Patient location during evaluation: PACU  Patient participation: complete - patient participated  Level of consciousness: awake and alert  Pain score: 2    Airway patency: patent  Anesthetic complications: no  Cardiovascular status: hemodynamically stable  Respiratory status: acceptable  Hydration status: euvolemic    PONV: none          There were no known notable events for this encounter.

## 2023-01-17 NOTE — ANESTHESIA PREPROCEDURE EVALUATION
Case: [id number] Date/Time: 01/17/23 0815    Procedures:       RIGHT MINIMALLY INVASIVE AKIN, INTERPHALANGEAL FUSION, IRRIGATION AND DEBRIDEMENT GREAT TOE (Right)      FUSION, JOINT, TOE      DEEP INCISION AND DRAINAGE,FOOT,MULTIPLE AREAS    Diagnosis: Toe ulcer, right, with necrosis of bone (HCC) [L97.514]    Pre-op diagnosis: Toe ulcer, right, with necrosis of bone (HCC) [L97.514]    Location: [place] [address] / SURGERY [facility]    Surgeons: Abhishek Edwards M.D.          Relevant Problems   ANESTHESIA   (positive) SARAY (obstructive sleep apnea)      CARDIAC   (positive) Atrial fibrillation status post cardioversion (HCC)   (positive) Atrial fibrillation with rapid ventricular response (HCC)      ENDO   (positive) Type 2 diabetes mellitus (HCC)   (positive) Type 2 diabetes mellitus without complication (HCC)      Other   (positive) Charcot foot due to diabetes mellitus (HCC)   (positive) Charcot's joint of ankle   (positive) Chronic osteomyelitis of left foot (HCC)   (positive) Osteomyelitis of foot, left, acute (HCC)       Physical Exam    Airway   Mallampati: II  TM distance: >3 FB  Neck ROM: full       Cardiovascular - normal exam  Rhythm: regular  Rate: normal  (-) murmur     Dental - normal exam           Pulmonary - normal exam  Breath sounds clear to auscultation     Abdominal    Neurological - normal exam                 Anesthesia Plan    ASA 3   ASA physical status 3 criteria: diabetes - poorly controlled    Plan - general       Airway plan will be ETT          Induction: intravenous    Postoperative Plan: Postoperative administration of opioids is intended.    Pertinent diagnostic labs and testing reviewed    Informed Consent:    Anesthetic plan and risks discussed with patient.    Use of blood products discussed with: patient whom consented to blood products.

## 2023-01-17 NOTE — ANESTHESIA TIME REPORT
Anesthesia Start and Stop Event Times     Date Time Event    1/17/2023 0804 Ready for Procedure     0816 Anesthesia Start     0856 Anesthesia Stop        Responsible Staff  01/17/23    Name Role Begin End    Beto Abdullahi D.O. Anesth 0816 0856        Overtime Reason:  no overtime (within assigned shift)    Comments:

## 2023-01-17 NOTE — DISCHARGE INSTRUCTIONS
HOME CARE INSTRUCTIONS    ACTIVITY: Rest and take it easy for the first 24 hours.  A responsible adult is recommended to remain with you during that time.  It is normal to feel sleepy.  We encourage you to not do anything that requires balance, judgment or coordination.    FOR 24 HOURS DO NOT:  Drive, operate machinery or run household appliances.  Drink beer or alcoholic beverages.  Make important decisions or sign legal documents.    SPECIAL INSTRUCTIONS:    Weight bearing as tolerated right lower extremity in postop shoe   Shoe at all times   Ice and elevate   Stay ahead of pain   Keep dressing clean and dry   Aspirin 81mg two times a day for clot prevention  Refer to MARTIN packet for further instructions    DIET: To avoid nausea, slowly advance diet as tolerated, avoiding spicy or greasy foods for the first day.  Add more substantial food to your diet according to your physician's instructions.  Babies can be fed formula or breast milk as soon as they are hungry.  INCREASE FLUIDS AND FIBER TO AVOID CONSTIPATION.    MEDICATIONS: Resume taking daily medication.  Take prescribed pain medication with food.  If no medication is prescribed, you may take non-aspirin pain medication if needed.  PAIN MEDICATION CAN BE VERY CONSTIPATING.  Take a stool softener or laxative such as senokot, pericolace, or milk of magnesia if needed.    A follow-up appointment should be arranged with your doctor in 1-2 weeks; call to schedule.    You should CALL YOUR PHYSICIAN if you develop:  Fever greater than 101 degrees F.  Pain not relieved by medication, or persistent nausea or vomiting.  Excessive bleeding (blood soaking through dressing) or unexpected drainage from the wound.  Extreme redness or swelling around the incision site, drainage of pus or foul smelling drainage.  Inability to urinate or empty your bladder within 8 hours.  Problems with breathing or chest pain.    You should call 911 if you develop problems with breathing or  chest pain.  If you are unable to contact your doctor or surgical center, you should go to the nearest emergency room or urgent care center.  Physician's telephone #: [phone]    Tylenol and Celebrex at 7:43am    MILD FLU-LIKE SYMPTOMS ARE NORMAL.  YOU MAY EXPERIENCE GENERALIZED MUSCLE ACHES, THROAT IRRITATION, HEADACHE AND/OR SOME NAUSEA.    If any questions arise, call your doctor.  If your doctor is not available, please feel free to call the Surgical Center at [phone].  The Center is open Monday through Friday from 7AM to 7PM.      A registered nurse may call you a few days after your surgery to see how you are doing after your procedure.    You may also receive a survey in the mail within the next two weeks and we ask that you take a few moments to complete the survey and return it to us.  Our goal is to provide you with very good care and we value your comments.     Depression / Suicide Risk    As you are discharged from this [facility] facility, it is important to learn how to keep safe from harming yourself.    Recognize the warning signs:  Abrupt changes in personality, positive or negative- including increase in energy   Giving away possessions  Change in eating patterns- significant weight changes-  positive or negative  Change in sleeping patterns- unable to sleep or sleeping all the time   Unwillingness or inability to communicate  Depression  Unusual sadness, discouragement and loneliness  Talk of wanting to die  Neglect of personal appearance   Rebelliousness- reckless behavior  Withdrawal from people/activities they love  Confusion- inability to concentrate     If you or a loved one observes any of these behaviors or has concerns about self-harm, here's what you can do:  Talk about it- your feelings and reasons for harming yourself  Remove any means that you might use to hurt yourself (examples: pills, rope, extension cords, firearm)  Get professional help from the community (Mental  Health, Substance Abuse, psychological counseling)  Do not be alone:Call your Safe Contact- someone whom you trust who will be there for you.  Call your local CRISIS HOTLINE 077-2655 or 597-109-0914  Call your local Children's Mobile Crisis Response Team [place] (795) 856-7543 or www.[employer]  Call the toll free National Suicide Prevention Hotlines   National Suicide Prevention Lifeline 724-402-IKMQ (0499)  [facility] Line Network 800-SUICIDE (164-0874)    I acknowledge receipt and understanding of these Home Care instructions.

## 2023-01-17 NOTE — OP REPORT
DATE OF SERVICE:  01/17/2023     PREOPERATIVE DIAGNOSES:  1.  Diabetes mellitus type 2.  2.  Right second toe ulceration.  3.  Right great interphalangeal arthritis.  4.  Right hallux valgus interphalangeus.     POSTOPERATIVE DIAGNOSES:  1.  Diabetes mellitus type 2.  2.  Right second toe ulceration.  3.  Right great interphalangeal arthritis.  4.  Right hallux valgus interphalangeus.     PROCEDURES:  1.  Irrigation and debridement of the right foot.  2.  Right minimally invasive interphalangeal fusion.  3.  Right proximal phalanx osteotomy, great toe.     SURGEON:  Abhishek Edwards MD     ASSISTANT:  EMY Azul     ANESTHESIA:  General with 30 mL local 0.5% Marcaine without epinephrine.     ESTIMATED BLOOD LOSS:  5 mL.     TOURNIQUET TIME:  None.     IMPLANTS:  Otis 5.0 Fixos headless compression screw, 46 mm in length.     COMPLICATIONS:  None.     OUTCOME:  PACU in stable condition.     HISTORY OF PRESENT ILLNESS:  This is a pleasant 67-year-old gentleman well   known to me.  He has developed a progressive hallux valgus interphalangeus and   stiffness in his great IP joint that has created a rubbing an ulcer in the   second toe.  He was indicated for the above-stated procedure, greeted in the   preoperative holding area and identified by name and medical record number.    The right lower extremity was marked.  Risks of procedure including bleeding,   infection, pain, malunion, nonunion, neurovascular damage, need for more   surgery were discussed.  He provided written consent.     DESCRIPTION OF PROCEDURE:  He was taken to the operating room and placed on   table in supine position.  Preoperative antibiotics were administered.    General anesthesia was induced.  Tourniquet was not placed.  His right lower   extremity prepped and draped in the usual sterile fashion.  Operative pause   was taken where all present were in agreement with patient identification,   laterality, and procedure to be  performed.  The second toe was covered while   we worked on the great toe.  We made a 5 cm longitudinal incision just dorsal   to midline on the medial aspect of the interphalangeal joint.  Under direct   radiographic guidance, we used a minimally invasive bur to remove cartilage   and subchondral bone from the joint.  We then used the bur to create a medial   closing wedge osteotomy of the proximal phalanx.  This corrected the alignment   of the toe in both the AP and lateral planes.  It relieved the pressure of   the great toe on the second.  Once in proper alignment, we placed a guide pin   for a 5 mm headless compression screw through the tip of the toe and into the   proximal phalanx.  Once satisfied with the alignment, we drilled for and   placed a 46 mm screw that had excellent compression and maintenance of   alignment.  No evidence of interval complication.  The incision was copiously   irrigated and closed with 3-0 nylon.     We used forceps and scalpel to remove thick surrounding callus from a 1 cm x 8   mm ulceration in the second toe depth to subcutaneous tissue and the proximal   interphalangeal joint, but did not track to bone.  There was no gross   purulence.  We copiously irrigated the wound, removed fibrinous tissue from   the ulcerative base.  There was appropriate blood flow to the zone of injury   for healing.  Adaptic gauze and compressive wrap were placed.  He was awakened   and extubated in stable condition.     POSTOPERATIVE COURSE:  He will be discharged home today assuming adequate pain   control, mobilization, weightbearing as tolerated in a postoperative shoe,   postoperative antibiotics until healing.  I will see him next week for a wound   check.  He will remain in the shoe until healing, approximately 8 weeks.        ______________________________  MD LISA MORGAN/HARRISON/ALFONSO    DD:  01/17/2023 09:15  DT:  01/17/2023 09:40    Job#:  [id number]

## 2023-01-17 NOTE — ANESTHESIA PROCEDURE NOTES
Airway  Performed by: Beto Abdullahi D.O.  Authorized by: Beto Abdullahi D.O.     Location:  OR  Urgency:  Elective  Indications for Airway Management:  Anesthesia      Spontaneous Ventilation: absent    Sedation Level:  Deep  Preoxygenated: Yes    Final Airway Type:  Supraglottic airway  Final Supraglottic Airway:  Standard LMA    SGA Size:  5  Number of Attempts at Approach:  1

## 2023-01-17 NOTE — OR NURSING
Pt verbalizes readiness for discharge. Instructions reviewed with pt by Nini CARBALLO. No further needs. Pt taken to car via wheelchair by CNA.

## 2023-08-08 ENCOUNTER — APPOINTMENT (RX ONLY)
Dept: URBAN - METROPOLITAN AREA CLINIC 6 | Facility: CLINIC | Age: 68
Setting detail: DERMATOLOGY
End: 2023-08-08

## 2023-08-08 DIAGNOSIS — I87.2 VENOUS INSUFFICIENCY (CHRONIC) (PERIPHERAL): ICD-10-CM

## 2023-08-08 DIAGNOSIS — L259 CONTACT DERMATITIS AND OTHER ECZEMA, UNSPECIFIED CAUSE: ICD-10-CM

## 2023-08-08 PROBLEM — L30.8 OTHER SPECIFIED DERMATITIS: Status: ACTIVE | Noted: 2023-08-08

## 2023-08-08 PROCEDURE — ? TREATMENT REGIMEN

## 2023-08-08 PROCEDURE — ? REFERRAL CORRESPONDENCE

## 2023-08-08 PROCEDURE — 99203 OFFICE O/P NEW LOW 30 MIN: CPT

## 2023-08-08 PROCEDURE — ? ADDITIONAL NOTES

## 2023-08-08 PROCEDURE — ? KOH PREP

## 2023-08-08 PROCEDURE — ? PRESCRIPTION

## 2023-08-08 PROCEDURE — ? COUNSELING

## 2023-08-08 RX ORDER — TRIAMCINOLONE ACETONIDE 1 MG/G
THIN LAYER CREAM TOPICAL BID
Qty: 80 | Refills: 3 | Status: ERX | COMMUNITY
Start: 2023-08-08

## 2023-08-08 RX ADMIN — TRIAMCINOLONE ACETONIDE THIN LAYER: 1 CREAM TOPICAL at 00:00

## 2023-08-08 ASSESSMENT — LOCATION SIMPLE DESCRIPTION DERM: LOCATION SIMPLE: RIGHT PRETIBIAL REGION

## 2023-08-08 ASSESSMENT — LOCATION DETAILED DESCRIPTION DERM
LOCATION DETAILED: RIGHT DISTAL PRETIBIAL REGION
LOCATION DETAILED: RIGHT MEDIAL DISTAL PRETIBIAL REGION

## 2023-08-08 ASSESSMENT — ITCH NUMERIC RATING SCALE: HOW SEVERE IS YOUR ITCHING?: 3

## 2023-08-08 ASSESSMENT — LOCATION ZONE DERM: LOCATION ZONE: LEG

## 2023-08-08 NOTE — PROCEDURE: ADDITIONAL NOTES
Detail Level: Simple
Render Risk Assessment In Note?: yes
Additional Notes: Has early changes of Lipodermatosclerosis and recommended compression stockings and elevation.

## 2023-08-08 NOTE — PROCEDURE: TREATMENT REGIMEN
Detail Level: Detailed
Plan: This would not be a contraindication to further foot surgery.
Initiate Treatment: Triamcinolone cream twice daily for 2-4weeks until clear and then on an as needed basis.
Continue Regimen: Am Lactin moisturizer daily

## 2023-12-27 ENCOUNTER — OFFICE VISIT (OUTPATIENT)
Dept: URGENT CARE | Facility: PHYSICIAN GROUP | Age: 68
End: 2023-12-27
Payer: COMMERCIAL

## 2023-12-27 VITALS
HEIGHT: 70 IN | RESPIRATION RATE: 16 BRPM | TEMPERATURE: 99.3 F | OXYGEN SATURATION: 94 % | BODY MASS INDEX: 28.92 KG/M2 | SYSTOLIC BLOOD PRESSURE: 146 MMHG | WEIGHT: 202 LBS | DIASTOLIC BLOOD PRESSURE: 82 MMHG | HEART RATE: 82 BPM

## 2023-12-27 DIAGNOSIS — J01.01 ACUTE RECURRENT MAXILLARY SINUSITIS: Primary | ICD-10-CM

## 2023-12-27 DIAGNOSIS — R03.0 ELEVATED BLOOD PRESSURE READING: ICD-10-CM

## 2023-12-27 PROCEDURE — 99213 OFFICE O/P EST LOW 20 MIN: CPT

## 2023-12-27 PROCEDURE — 3077F SYST BP >= 140 MM HG: CPT

## 2023-12-27 PROCEDURE — 3079F DIAST BP 80-89 MM HG: CPT

## 2023-12-27 RX ORDER — AMOXICILLIN AND CLAVULANATE POTASSIUM 875; 125 MG/1; MG/1
1 TABLET, FILM COATED ORAL 2 TIMES DAILY
Qty: 14 TABLET | Refills: 0 | Status: SHIPPED | OUTPATIENT
Start: 2023-12-27 | End: 2024-01-03

## 2023-12-27 NOTE — LETTER
December 27, 2023    To Whom It May Concern:         This is confirmation that Edward Ohara attended his scheduled appointment with JESÚS Pickering on 12/27/23. He is medically excused from work due to illness from 12/17/20023 through 12/19/2023. He may return to work on 12/30/2023 or sooner if he feels better.          If you have any questions please do not hesitate to call me at the phone number listed below.    Sincerely,          GUNJAN Pickering.  [phone]

## 2023-12-28 NOTE — PROGRESS NOTES
Subjective:   Edward Ohara is a 68 y.o. male who presents for Sinusitis (Feeling feverish  3 days/Sinus drainage) and Headache          I introduced myself to the patient and informed them that I am a family nurse practitioner.    HPI:Marco comes in today c/o sinus pain and congestion, thick green drainage. Onset was over a week ago, but got worse in the last 3 days.  Patient describes symptoms as constant. They describe the pain as aching, pressure. Aggravating factors include lying down flat, bending forward. Relieving factors include none. Treatments tried at home include Tylenol and Mucinex with no effect. They describe their symptoms as severe. States he is immunocompromised, has HIV.  States he had a history of chronic sinus infections in the past.      Review of Systems   Constitutional:  Positive for chills and malaise/fatigue. Negative for fever.   HENT:  Positive for congestion and sinus pain. Negative for ear pain and sore throat.    Eyes:  Negative for pain, discharge and redness.   Respiratory:  Negative for cough, sputum production, shortness of breath, wheezing and stridor.    Cardiovascular:  Negative for chest pain and palpitations.   Gastrointestinal:  Negative for abdominal pain, diarrhea, nausea and vomiting.   Genitourinary:  Negative for dysuria.   Musculoskeletal:  Negative for myalgias.   Skin:  Negative for rash.   Neurological:  Negative for dizziness and headaches.       Medications: acetaminophen Tabs  Blood Glucose Test Strips  Descovy Tabs  Etravirine Tabs  insulin glargine Soln  INTELENCE PO  lidocaine Ptch  losartan Tabs  metformin  multivitamin Tabs  PARoxetine Tabs  pregabalin Caps  Prezcobix Tabs  rosuvastatin Tabs  sulfamethoxazole-trimethoprim  TURMERIC PO     Allergies: Oxycodone    Problem List: does not have any pertinent problems on file.    Surgical History:  Past Surgical History:   Procedure Laterality Date    PB FUSION BIG TOE,I-P JOINT Right 1/17/2023     Procedure: FUSION, JOINT, TOE;  Surgeon: Abhishek Edwards M.D.;  Location: SURGERY [facility];  Service: Orthopedics    PB DEEP DISSEC FOOT INFEC,MULTIPLE Right 1/17/2023    Procedure: DEEP INCISION AND DRAINAGE,FOOT,MULTIPLE AREAS;  Surgeon: Abhishek Edwards M.D.;  Location: [facility];  Service: Orthopedics    BUNIONECTOMY Right 1/17/2023    Procedure: RIGHT MINIMALLY INVASIVE AKIN, INTERPHALANGEAL FUSION, IRRIGATION AND DEBRIDEMENT GREAT TOE;  Surgeon: Abhishek Edwards M.D.;  Location: SURGERY [facility];  Service: Orthopedics    IRRIGATION & DEBRIDEMENT ORTHO Left 12/22/2016    Procedure: IRRIGATION & DEBRIDEMENT ORTHO FOOT;  Surgeon: Abhishek Edwards M.D.;  Location: [facility];  Service:     HARDWARE REMOVAL ORTHO Left 12/22/2016    Procedure: HARDWARE REMOVAL ORTHO;  Surgeon: Abhishek Edwards M.D.;  Location: [facility];  Service:     IRRIGATION & DEBRIDEMENT ORTHO Left 7/16/2016    Procedure: IRRIGATION & DEBRIDEMENT ORTHO FOOT WITH WOUND VAC;  Surgeon: Abhishek Edwards M.D.;  Location: [facility];  Service:     ORIF, ANKLE Left 5/26/2016    Procedure: ANKLE ORIF Navicular , talonavicular, gastrocnemius recession;  Surgeon: Abhishek Edwards M.D.;  Location: [facility];  Service:     ANKLE FUSION Left 5/26/2016    Procedure: ANKLE FUSION FOR: navicular cuneiform fusion , allograft ;  Surgeon: Abhishek Edwards M.D.;  Location: [facility];  Service:     OTHER ORTHOPEDIC SURGERY Bilateral 1994    juanita. carpal tunnel release    OTHER  1977    sinus surgery    OTHER  1974    tonsillectomy    OTHER  1972    pilonidal cyst removed    OTHER ABDOMINAL SURGERY  1969    appendectomy    OTHER ORTHOPEDIC SURGERY Right 1967    ganglion cyst removed    APPENDECTOMY      CARPAL TUNNEL RELEASE      OTHER ORTHOPEDIC SURGERY      bilateral heel spurs removed    SINUSCOPE         Past Social Hx:   reports that he has never smoked. He has never used  "smokeless tobacco. He reports current alcohol use. He reports that he does not use drugs.     Past Family Hx:   family history includes Diabetes in his father and mother; Heart Attack in his mother; Heart Disease in his father.     Problem list, medications, and allergies reviewed by myself today in Epic.   I have documented what I find to be significant in regards to past medical, social, family and surgical history  in my HPI or under PMH/PSH/FH review section, otherwise it is noncontributory     Objective:     BP (!) 146/82   Pulse 82   Temp 37.4 °C (99.3 °F)   Resp 16   Ht 1.778 m (5' 10\")   Wt 91.6 kg (202 lb)   SpO2 94%   BMI 28.98 kg/m²     During this visit, appropriate PPE was worn, and hand hygiene was performed.    Physical Exam  Vitals reviewed.   Constitutional:       General: He is not in acute distress.     Appearance: Normal appearance. He is not ill-appearing or toxic-appearing.   HENT:      Head: Normocephalic and atraumatic.      Right Ear: Tympanic membrane, ear canal and external ear normal. There is no impacted cerumen.      Left Ear: Tympanic membrane, ear canal and external ear normal. There is no impacted cerumen.      Nose: Congestion present. No rhinorrhea.      Right Turbinates: Swollen.      Left Turbinates: Swollen.      Right Sinus: Maxillary sinus tenderness and frontal sinus tenderness present.      Left Sinus: Maxillary sinus tenderness and frontal sinus tenderness present.      Comments: There are thick yellow exudates present in nasal passages     Mouth/Throat:      Pharynx: Oropharynx is clear. No oropharyngeal exudate or posterior oropharyngeal erythema.   Eyes:      General: No scleral icterus.        Right eye: No discharge.         Left eye: No discharge.      Extraocular Movements: Extraocular movements intact.      Conjunctiva/sclera: Conjunctivae normal.      Pupils: Pupils are equal, round, and reactive to light.   Cardiovascular:      Rate and Rhythm: Normal rate " and regular rhythm.      Heart sounds: Normal heart sounds. No murmur heard.     No friction rub. No gallop.   Pulmonary:      Breath sounds: Normal breath sounds. No wheezing, rhonchi or rales.   Abdominal:      General: There is no distension.   Musculoskeletal:         General: Normal range of motion.      Cervical back: Normal range of motion. No rigidity.      Right lower leg: No edema.      Left lower leg: No edema.   Lymphadenopathy:      Cervical: No cervical adenopathy.   Skin:     General: Skin is warm and dry.      Coloration: Skin is not jaundiced.   Neurological:      Mental Status: He is alert and oriented to person, place, and time. Mental status is at baseline.   Psychiatric:         Mood and Affect: Mood normal.         Behavior: Behavior normal.         Assessment/Plan:     Diagnosis and associated orders:     1. Acute recurrent maxillary sinusitis  amoxicillin-clavulanate (AUGMENTIN) 875-125 MG Tab      2. Elevated blood pressure reading           Comments/MDM:     1. Acute recurrent maxillary sinusitis  We discussed management of sinus infection including -  - supportive care measures such as drinking plenty of fluids, use a humidifier in room at night to keep mucous membranes moist, applying warm compresses to face and forehead may be useful in relieving sinus pain and pressure, using a sinus wash such as the NeilMed Neti bottle several times a day as needed, Flonase daily, OTC second-generation non-sedating antihistamine such as Zyrtec, Allegra, or Loratadine daily, alternate Tylenol with ibuprofen (unless contraindicated) for pain and fever.    -We did discuss red flags and reasons to return to urgent care versus when to go to the ER.    Discussed DDx, management options (risks,benefits, and alternatives to planned treatment), natural progression.  Questions were encouraged and answered. Written information was provided and I did go over this with the patient in clinic today.  Instructed  patient regarding red flags and to return to urgent care prn if new or worsening sx or if there is no improvement in condition prn.    Advised the patient to follow-up with the primary care physician for recheck, reevaluation, and consideration of further management.  I did instruct patient regarding medications prescribed, purpose, side effects, cautions.  Instructed patient to get a pharmacy consult when picking up any prescribed medications.  Strict ER precautions discussed for any mastoid pain, swelling of the face or around the eyes, visual disturbances, eye pain, chest pain, difficulty breathing, difficulty swallowing, wheezing, stridor, or drooling, fever that does not respond to ibuprofen and Tylenol, inability to tolerate fluids, dehydration, especially in the setting of fever, chills, nausea or vomiting, lethargy.     Patient states they have good understanding and they are agreeable with the plan of care.   I did instruct patient regarding Augmentin, purpose, side effects, precautions.  Most recent lab work of 1/17/2023 shows good renal and hepatic function.  - amoxicillin-clavulanate (AUGMENTIN) 875-125 MG Tab; Take 1 Tablet by mouth 2 times a day for 7 days.  Dispense: 14 Tablet; Refill: 0    2. Elevated blood pressure reading  I instructed patient regarding the long-term risks of having uncontrolled high blood pressure including cardiovascular disease, increased risk of PVD, MI, CVA. I instructed patient to get a home BP monitor from pharmacy, check their blood pressure twice a day, morning and evening, keep a written log, make an appointment to see PCP and take the log of blood pressures with them for PCP to review to decide whether antihypertensive therapy is indicated.  Patient states they understand instructions and will do so.           Pt is clinically stable at today's acute urgent care visit. Vital signs are normal and reassuring.  No acute distress noted. Appropriate for outpatient management  at this time.        I personally reviewed prior external notes and test results pertinent to today's visit.  I have independently reviewed and interpreted all diagnostics ordered during this urgent care acute visit.        Please note that this dictation was created using voice recognition software. I have made a reasonable attempt to correct obvious errors, but I expect that there are errors of grammar and possibly content that I did not discover before finalizing the note.    This note was electronically signed by Petey QUEVEDO, KAUSHIK, JAEL, SWATI

## 2023-12-31 ASSESSMENT — ENCOUNTER SYMPTOMS
COUGH: 0
FEVER: 0
EYE DISCHARGE: 0
MYALGIAS: 0
WHEEZING: 0
SORE THROAT: 0
VOMITING: 0
STRIDOR: 0
PALPITATIONS: 0
ABDOMINAL PAIN: 0
DIZZINESS: 0
SHORTNESS OF BREATH: 0
SINUS PAIN: 1
CHILLS: 1
DIARRHEA: 0
HEADACHES: 0
NAUSEA: 0
EYE REDNESS: 0
EYE PAIN: 0
SPUTUM PRODUCTION: 0

## 2024-03-12 ENCOUNTER — OFFICE VISIT (OUTPATIENT)
Dept: URGENT CARE | Facility: PHYSICIAN GROUP | Age: 69
End: 2024-03-12
Payer: COMMERCIAL

## 2024-03-12 VITALS
HEART RATE: 69 BPM | RESPIRATION RATE: 18 BRPM | TEMPERATURE: 97.8 F | WEIGHT: 198 LBS | SYSTOLIC BLOOD PRESSURE: 140 MMHG | OXYGEN SATURATION: 95 % | BODY MASS INDEX: 28.35 KG/M2 | HEIGHT: 70 IN | DIASTOLIC BLOOD PRESSURE: 66 MMHG

## 2024-03-12 DIAGNOSIS — M79.601 BILATERAL ARM PAIN: ICD-10-CM

## 2024-03-12 DIAGNOSIS — M79.602 BILATERAL ARM PAIN: ICD-10-CM

## 2024-03-12 PROCEDURE — 3077F SYST BP >= 140 MM HG: CPT | Performed by: STUDENT IN AN ORGANIZED HEALTH CARE EDUCATION/TRAINING PROGRAM

## 2024-03-12 PROCEDURE — 99214 OFFICE O/P EST MOD 30 MIN: CPT | Performed by: STUDENT IN AN ORGANIZED HEALTH CARE EDUCATION/TRAINING PROGRAM

## 2024-03-12 PROCEDURE — 3078F DIAST BP <80 MM HG: CPT | Performed by: STUDENT IN AN ORGANIZED HEALTH CARE EDUCATION/TRAINING PROGRAM

## 2024-03-12 ASSESSMENT — ENCOUNTER SYMPTOMS
COUGH: 0
TINGLING: 0
SENSORY CHANGE: 0
WHEEZING: 0
CHILLS: 0
SHORTNESS OF BREATH: 0
FEVER: 1
PALPITATIONS: 0
WEAKNESS: 0

## 2024-03-12 NOTE — PROGRESS NOTES
"Subjective     Marco Ohara is a 68 y.o. male who presents with Other (Pain / swelling and warm to touch and PCP told him to go to UC or ER to get checked and get an US for both arms. Patient went to ER and did not want to wait any longer so he left. This happened after he went to get blood taken out. )            Marco is a 68 y.o. male who presents to urgent care for bilateral eye redness, swelling and pain.  Patient has been evaluated by primary care provider.  PCP thought initially symptoms were due to cellulitis in which patient was started on antibiotics which did not help.  Patient returned to follow-up with primary care provider and PCP is now concerned for phlebitis and recommended patient go to the ER for evaluation and ultrasound of bilateral arms.  Patient went to the ER and states he waited for 2 hours.  Patient was pulled back by ER and was told that they were going to \"run some labs.\" Patient states he doesn't understand why they had to run labs to evaluate him. Patient left ER and now presents to urgent care for evaluation and is requesting US of bilateral arms due to recommendation by PCP.        Review of Systems   Constitutional:  Positive for fever. Negative for chills and malaise/fatigue.   Respiratory:  Negative for cough, shortness of breath and wheezing.    Cardiovascular:  Negative for chest pain and palpitations.   Neurological:  Negative for tingling, sensory change and weakness.   All other systems reviewed and are negative.             Objective     BP (!) 140/66   Pulse 69   Temp 36.6 °C (97.8 °F) (Temporal)   Resp 18   Ht 1.778 m (5' 10\")   Wt 89.8 kg (198 lb)   SpO2 95%   BMI 28.41 kg/m²      Physical Exam  Musculoskeletal:      Right elbow: Normal. No swelling. Normal range of motion.      Left elbow: Normal. No swelling. Normal range of motion.      Right forearm: No swelling, edema or bony tenderness.      Left forearm: No swelling, edema or bony tenderness.      " Right wrist: Normal.      Left wrist: Normal.      Right hand: Normal.      Left hand: Normal.        Arms:       Comments: Faint erythema to bilateral forearms.                             Assessment & Plan        1. Bilateral arm pain  - Undiagnosed new problem with uncertain prognosis.  - US-EXTREMITY VENOUS UPPER BILAT; Future   - Earliest appt available is tomorrow at 5pm. Patient scheduled tomorrow at 5pm at [address]     Differential diagnoses, supportive care measures and indications for immediate follow-up discussed with patient. Pathogenesis of diagnosis discussed including typical length and natural progression. Strict ER precautions discussed.    Instructed to return to urgent care or nearest emergency department if symptoms fail to improve, for any change in condition, further concerns, or new concerning symptoms.    Patient states understanding and agrees with the plan of care and discharge instructions.

## 2024-03-13 ENCOUNTER — HOSPITAL ENCOUNTER (OUTPATIENT)
Dept: RADIOLOGY | Facility: MEDICAL CENTER | Age: 69
End: 2024-03-13
Attending: STUDENT IN AN ORGANIZED HEALTH CARE EDUCATION/TRAINING PROGRAM
Payer: COMMERCIAL

## 2024-03-13 ENCOUNTER — TELEPHONE (OUTPATIENT)
Dept: URGENT CARE | Facility: PHYSICIAN GROUP | Age: 69
End: 2024-03-13

## 2024-03-13 DIAGNOSIS — M79.601 BILATERAL ARM PAIN: ICD-10-CM

## 2024-03-13 DIAGNOSIS — M79.602 BILATERAL ARM PAIN: ICD-10-CM

## 2024-03-13 PROCEDURE — 93970 EXTREMITY STUDY: CPT

## 2024-03-14 NOTE — TELEPHONE ENCOUNTER
Tried contacting patient to review ultrasound results.  No answer.  Voicemail left to check MyChart.  See results below.    RADIOLOGY RESULTS   US-EXTREMITY VENOUS UPPER BILAT    Result Date: 3/13/2024  3/13/2024 5:14 PM HISTORY/REASON FOR EXAM:  Bilateral arm edema TECHNIQUE/EXAM DESCRIPTION: Real-time sonography of the bilateral upper extremity deep veins was performed with grey-scale graded compression, color and duplex Doppler. COMPARISON:  None. FINDINGS: There is a normal grayscale appearance with color flow identified in the right and left internal jugular, subclavian, axillary, brachial, radial, ulnar, cephalic and basilic veins. There are no findings to suggest acute deep venous thrombosis.     1. No evidence of bilateral upper extremity deep venous thrombosis.          Advised to follow-up with PCP.    Instructed to return to urgent care or nearest emergency department if symptoms fail to improve, for any change in condition, further concerns, or new concerning symptoms.

## 2024-10-07 PROBLEM — M21.611 BUNION, RIGHT: Status: ACTIVE | Noted: 2024-10-07

## 2024-12-09 ENCOUNTER — OFFICE VISIT (OUTPATIENT)
Dept: URGENT CARE | Facility: PHYSICIAN GROUP | Age: 69
End: 2024-12-09
Payer: COMMERCIAL

## 2024-12-09 VITALS
SYSTOLIC BLOOD PRESSURE: 124 MMHG | BODY MASS INDEX: 28.49 KG/M2 | DIASTOLIC BLOOD PRESSURE: 62 MMHG | TEMPERATURE: 99.8 F | RESPIRATION RATE: 14 BRPM | HEART RATE: 110 BPM | WEIGHT: 188 LBS | OXYGEN SATURATION: 94 % | HEIGHT: 68 IN

## 2024-12-09 DIAGNOSIS — J06.9 ACUTE URI: ICD-10-CM

## 2024-12-09 LAB
FLUAV RNA SPEC QL NAA+PROBE: NEGATIVE
FLUBV RNA SPEC QL NAA+PROBE: NEGATIVE
RSV RNA SPEC QL NAA+PROBE: NEGATIVE
SARS-COV-2 RNA RESP QL NAA+PROBE: NEGATIVE

## 2024-12-09 PROCEDURE — 0241U POCT CEPHEID COV-2, FLU A/B, RSV - PCR: CPT | Performed by: PHYSICIAN ASSISTANT

## 2024-12-09 PROCEDURE — 99213 OFFICE O/P EST LOW 20 MIN: CPT | Performed by: PHYSICIAN ASSISTANT

## 2024-12-09 PROCEDURE — 3078F DIAST BP <80 MM HG: CPT | Performed by: PHYSICIAN ASSISTANT

## 2024-12-09 PROCEDURE — 3074F SYST BP LT 130 MM HG: CPT | Performed by: PHYSICIAN ASSISTANT

## 2024-12-09 ASSESSMENT — ENCOUNTER SYMPTOMS
ABDOMINAL PAIN: 0
VOMITING: 0
HEADACHES: 1
FEVER: 0
COUGH: 1
DIARRHEA: 0
SPUTUM PRODUCTION: 1
CHILLS: 0
MYALGIAS: 0
SORE THROAT: 0
SHORTNESS OF BREATH: 0
SINUS PAIN: 1
NAUSEA: 0

## 2024-12-09 NOTE — PROGRESS NOTES
Subjective     Marco Ohara is a 69 y.o. male who presents with Sinus Problem (X 4 days sinus pressure, headache)    HPI:  Edward Ohara is a 69 y.o. male who presents today for evaluation of URI symptoms.  Patient is concerned he has a sinus infection.  Says that his symptoms started 4 days ago.  He has had congestion, headache, sinus pressure, cough that is sometimes productive of sputum.  He has been using cough drops but no other medications.  He has not had any fever.        Review of Systems   Constitutional:  Positive for malaise/fatigue. Negative for chills and fever.   HENT:  Positive for congestion and sinus pain. Negative for sore throat.    Respiratory:  Positive for cough and sputum production. Negative for shortness of breath.    Cardiovascular:  Negative for chest pain.   Gastrointestinal:  Negative for abdominal pain, diarrhea, nausea and vomiting.   Musculoskeletal:  Negative for myalgias.   Neurological:  Positive for headaches.         PMH:  has a past medical history of Arthritis, Back pain, Bronchitis, Cancer ([facility]) (01/16/2023), Cataract (01/16/2023), Chickenpox, Depression, Diabetes ([facility]), High cholesterol (01/16/2023), HIV (human immunodeficiency virus infection) ([facility]), Mumps, Neuropathy, Pain (01/16/2023), Renal disorder, Sleep apnea, Sleep apnea (01/16/2023), Snoring, Tonsillitis, and Whooping cough.    He has no past medical history of Hypertension.  MEDS:   Current Outpatient Medications:     Etravirine (INTELENCE) 25 MG Tab, Take  by mouth., Disp: , Rfl:     DULoxetine (CYMBALTA) 20 MG Cap DR Particles, Take 20 mg by mouth every day., Disp: , Rfl:     insulin aspart (NOVOLOG FLEXPEN) 100 UNIT/ML injection PEN, , Disp: , Rfl:     Mirabegron ER (MYRBETRIQ) 50 MG TABLET SR 24 HR, Myrbetriq 50 mg tablet,extended release  Take 1 tablet every day by oral route., Disp: , Rfl:     OZEMPIC, 1 MG/DOSE, 4 MG/3ML Solution Pen-injector, as directed Subcutaneous, Disp: , Rfl:      Darunavir-Cobicistat (PREZCOBIX) 800-150 MG Tab, 1 tablet with food Orally Once a day, Disp: , Rfl:     emtricitabine-TAF (DESCOVY) 200-25 mg Tab tablet, Take 1 Tablet by mouth every day., Disp: , Rfl:     insulin glargine (LANTUS SOLOSTAR) 100 UNIT/ML Solution Pen-injector injection, , Disp: , Rfl:     PARoxetine (PAXIL) 20 MG Tab, Take 1 Tablet by mouth every morning., Disp: , Rfl:     pregabalin (LYRICA) 150 MG Cap, Take 150 mg by mouth 2 times a day., Disp: , Rfl:     PREZCOBIX 800-150 MG Tab, Take 1 Tablet by mouth every day., Disp: , Rfl:     rosuvastatin (CRESTOR) 5 MG Tab, Take 5 mg by mouth every day., Disp: , Rfl:     losartan (COZAAR) 25 MG Tab, Take 25 mg by mouth every day., Disp: , Rfl:     Blood Glucose Monitoring Suppl SUPPLIES Misc, Ultra one glucometer test strips for blood sugar check 4 times a day, Disp: 150 Each, Rfl: 6    multivitamin (THERAGRAN) Tab, Take 2 Tablets by mouth every day., Disp: , Rfl:     SYNJARDY XR  MG TABLET SR 24 HR, 1 tablet with breakfast Orally Once a day (Patient not taking: Reported on 12/9/2024), Disp: , Rfl:   ALLERGIES:   Allergies   Allergen Reactions    Hydrocodone Unspecified    Oxycodone      Hallucination  Rxn = May 2016    Ritonavir Diarrhea and Nausea     SURGHX:   Past Surgical History:   Procedure Laterality Date    PB FUSION BIG TOE,I-P JOINT Right 1/17/2023    Procedure: FUSION, JOINT, TOE;  Surgeon: Abhishek Edwards M.D.;  Location: SURGERY [facility];  Service: Orthopedics    PB DEEP DISSEC FOOT INFEC,MULTIPLE Right 1/17/2023    Procedure: DEEP INCISION AND DRAINAGE,FOOT,MULTIPLE AREAS;  Surgeon: Abhishek Edwards M.D.;  Location: [facility];  Service: Orthopedics    BUNIONECTOMY Right 1/17/2023    Procedure: RIGHT MINIMALLY INVASIVE AKIN, INTERPHALANGEAL FUSION, IRRIGATION AND DEBRIDEMENT GREAT TOE;  Surgeon: Abhishek Edwards M.D.;  Location: SURGERY [facility];  Service: Orthopedics    IRRIGATION & DEBRIDEMENT ORTHO Left 12/22/2016  "   Procedure: IRRIGATION & DEBRIDEMENT ORTHO FOOT;  Surgeon: Abhishek Edwards M.D.;  Location: SURGERY [facility];  Service:     HARDWARE REMOVAL ORTHO Left 12/22/2016    Procedure: HARDWARE REMOVAL ORTHO;  Surgeon: Abhishek Edwards M.D.;  Location: SURGERY [facility];  Service:     IRRIGATION & DEBRIDEMENT ORTHO Left 7/16/2016    Procedure: IRRIGATION & DEBRIDEMENT ORTHO FOOT WITH WOUND VAC;  Surgeon: Abhishek Edwards M.D.;  Location: SURGERY [facility];  Service:     ORIF, ANKLE Left 5/26/2016    Procedure: ANKLE ORIF Navicular , talonavicular, gastrocnemius recession;  Surgeon: Abhishek Edwards M.D.;  Location: SURGERY [facility];  Service:     ANKLE FUSION Left 5/26/2016    Procedure: ANKLE FUSION FOR: navicular cuneiform fusion , allograft ;  Surgeon: Abhishek Edwards M.D.;  Location: SURGERY [facility];  Service:     OTHER ORTHOPEDIC SURGERY Bilateral 1994    juanita. carpal tunnel release    OTHER  1977    sinus surgery    OTHER  1974    tonsillectomy    OTHER  1972    pilonidal cyst removed    OTHER ABDOMINAL SURGERY  1969    appendectomy    OTHER ORTHOPEDIC SURGERY Right 1967    ganglion cyst removed    APPENDECTOMY      CARPAL TUNNEL RELEASE      OTHER ORTHOPEDIC SURGERY      bilateral heel spurs removed    SINUSCOPE       SOCHX:  reports that he has never smoked. He has never been exposed to tobacco smoke. He has never used smokeless tobacco. He reports current alcohol use. He reports that he does not use drugs.  FH: Family history was reviewed, no pertinent findings to report        Objective     /62   Pulse (!) 110   Temp 37.7 °C (99.8 °F)   Resp 14   Ht 1.727 m (5' 8\")   Wt 85.3 kg (188 lb)   SpO2 94%   BMI 28.59 kg/m²      Physical Exam  Constitutional:       Appearance: He is well-developed.   HENT:      Head: Normocephalic and atraumatic.      Right Ear: Tympanic membrane, ear canal and external ear normal.      Left Ear: Tympanic membrane, ear canal and external " ear normal.      Nose: Mucosal edema and congestion present. No rhinorrhea.      Right Sinus: Maxillary sinus tenderness and frontal sinus tenderness present.      Left Sinus: Maxillary sinus tenderness and frontal sinus tenderness present.   Eyes:      Conjunctiva/sclera: Conjunctivae normal.      Pupils: Pupils are equal, round, and reactive to light.   Cardiovascular:      Rate and Rhythm: Normal rate and regular rhythm.      Heart sounds: Normal heart sounds. No murmur heard.  Pulmonary:      Effort: Pulmonary effort is normal.      Breath sounds: Normal breath sounds. No wheezing.   Musculoskeletal:      Cervical back: Normal range of motion.   Lymphadenopathy:      Cervical: No cervical adenopathy.   Skin:     General: Skin is warm and dry.      Capillary Refill: Capillary refill takes less than 2 seconds.   Neurological:      Mental Status: He is alert and oriented to person, place, and time.   Psychiatric:         Behavior: Behavior normal.         Judgment: Judgment normal.       POCT CoV-2, Flu A/B, RSV by PCR - Negative      Assessment & Plan     1. Acute URI  - POCT CoV-2, Flu A/B, RSV by PCR  - amoxicillin-clavulanate (AUGMENTIN) 875-125 MG Tab; Take 1 Tablet by mouth 2 times a day for 7 days.  Dispense: 14 Tablet; Refill: 0  PCR test negative for COVID, influenza, and RSV.  Discussed with patient that his symptoms still seem most consistent with viral etiology.  He was very adamant about wanting antibiotics, however.  Course of antibiotics sent to pharmacy to try but reiterated that he should be utilizing over-the-counter medications and supportive care measures to treat his symptoms as well.        Differential Diagnosis, natural history, and supportive care discussed. Return to the Urgent Care or follow up with your PCP if symptoms fail to resolve, or for any new or worsening symptoms. Emergency room precautions discussed. Patient and/or family appears understanding of information.

## (undated) DEVICE — SET EXTENSION WITH 2 PORTS (48EA/CA) ***PART #2C8610 IS A SUBSTITUTE*****

## (undated) DEVICE — SPLINT PLASTER 5 IN X 30 IN - (50EA/BX 6BX/CA)

## (undated) DEVICE — DRAPE C-ARM LARGE 41IN X 74 IN - (10/BX 2BX/CA)

## (undated) DEVICE — GOWN WARMING STANDARD FLEX - (30/CA)

## (undated) DEVICE — BLADE SURGICAL #15 - (50/BX 3BX/CA)

## (undated) DEVICE — WIRE, GUIDE

## (undated) DEVICE — Device

## (undated) DEVICE — SODIUM CHL IRRIGATION 0.9% 1000ML (12EA/CA)

## (undated) DEVICE — SUTURE GENERAL

## (undated) DEVICE — BIT DRILL DIA3.5MM CANNULATED L ADD ON FITTING DISPOSABLE FOR 7MM HEADLESS COMPRESSION SCREW

## (undated) DEVICE — GLOVE BIOGEL INDICATOR SZ 8.5 SURGICAL PF LTX - (50/BX 4BX/CA)

## (undated) DEVICE — DRESSING 3X3 ADAPTIC GAUZE - (50EA/CT)

## (undated) DEVICE — GLOVE BIOGEL M SZ 8 SURGICAL PF LTX - (50/BX 4BX/CA)

## (undated) DEVICE — BANDAGE ELASTIC 4 HONEYCOMB - 4"X5YD LF (20/CA)"

## (undated) DEVICE — CANISTER SUCTION 3000ML MECHANICAL FILTER AUTO SHUTOFF MEDI-VAC NONSTERILE LF DISP  (40EA/CA)

## (undated) DEVICE — DRESSING 3X8 ADAPTIC GAUZE - NON-ADHERING (36/PK 6PK/BX)

## (undated) DEVICE — COVER LIGHT HANDLE ALC PLUS DISP (18EA/BX)

## (undated) DEVICE — PACK LOWER EXTREMITY - (2/CA)

## (undated) DEVICE — SUCTION INSTRUMENT YANKAUER BULBOUS TIP W/O VENT (50EA/CA)

## (undated) DEVICE — SENSOR OXIMETER ADULT SPO2 RD SET (20EA/BX)

## (undated) DEVICE — SLEEVE VASO CALF MED - (10PR/CA)

## (undated) DEVICE — DRAPE 36X28IN RAD CARM BND BG - (25/CA) O

## (undated) DEVICE — TOWEL STOP TIMEOUT SAFETY FLAG (40EA/CA)

## (undated) DEVICE — PADDING CAST 4 IN STERILE - 4 X 4 YDS (24/CA)

## (undated) DEVICE — ELECTRODE DUAL RETURN W/ CORD - (50/PK)

## (undated) DEVICE — SET LEADWIRE 5 LEAD BEDSIDE DISPOSABLE ECG (1SET OF 5/EA)

## (undated) DEVICE — TUBING CLEARLINK DUO-VENT - C-FLO (48EA/CA)

## (undated) DEVICE — PAD LAP STERILE 18 X 18 - (5/PK 40PK/CA)

## (undated) DEVICE — LACTATED RINGERS INJ 1000 ML - (14EA/CA 60CA/PF)

## (undated) DEVICE — DRAPE LARGE 3 QUARTER - (20/CA)

## (undated) DEVICE — GLOVE BIOGEL SZ 8 SURGICAL PF LTX - (50PR/BX 4BX/CA)

## (undated) DEVICE — GOWN SURGEONS X-LARGE - DISP. (30/CA)